# Patient Record
Sex: FEMALE | Race: WHITE | NOT HISPANIC OR LATINO | Employment: OTHER | URBAN - METROPOLITAN AREA
[De-identification: names, ages, dates, MRNs, and addresses within clinical notes are randomized per-mention and may not be internally consistent; named-entity substitution may affect disease eponyms.]

---

## 2017-01-02 ENCOUNTER — TELEPHONE (OUTPATIENT)
Dept: FAMILY MEDICINE | Facility: CLINIC | Age: 52
End: 2017-01-02

## 2017-01-02 NOTE — TELEPHONE ENCOUNTER
Reason for call:  Symptom  Reason for call:  Patient reporting a symptom    Symptom or request: flu sx    Duration (how long have symptoms been present): 5 days    Have you been treated for this before? No    Additional comments: call to advise    Phone Number patient can be reached at: [phone]    Best Time:  any    Can we leave a detailed message on this number:  YES    Call taken on 1/2/2017 at 9:23 AM by Lili Wilhelm

## 2017-01-02 NOTE — TELEPHONE ENCOUNTER
Kamilah Perry is a 51 year old female who calls with concerns of back pain and cough.    NURSING ASSESSMENT:  Description:  Patient states started 12/29/16. Low back pain, whole low back. Achy. Couple days ago hip pain present. Today woke with cramping in right lower leg. Standing and sitting makes back pain worse. OTC medication does not help. Headaches present in the morning, but go away once she gets up for the day. Cough present-dry during day, productive upon waking-clear to yellow in color. Denies weakness, radiating pain, dizziness/lightheadedness, trouble with bowel or bladder, trouble walking or standing, injury, sore throat, fever, ear congestion/pain.   Onset/duration:  12/29/16  Precip. factors:  n/a  Associated symptoms:  See above  Improves/worsens symptoms:  n/a  Pain scale (0-10)   3/10  Last exam/Treatment:  7/1816    Allergies:   Allergies   Allergen Reactions     Sulfa Drugs Hives       MEDICATIONS:   Taking medication(s) as prescribed? N/A  Taking over the counter medication(s?) N/A  Any medication side effects? No significant side effects    Any barriers to taking medication(s) as prescribed?  N/A  Medication(s) improving/managing symptoms?  N/A  Medication reconciliation completed: N/A      NURSING PLAN: Nursing advice to patient home care remedies per protocols. patient would like to wait and see if improvement occurs, otherwise will schedule    RECOMMENDED DISPOSITION:  See in 24 hours - see nursing plan.   Will comply with recommendation: Yes  If further questions/concerns or if symptoms do not improve, worsen or new symptoms develop, call your PCP or [facility] as soon as possible.      Guideline used:  Telephone Triage Protocols for Nurses, Fourth Edition, Zakiya Cohn  Back pain  Headaches   Cough     Maricruz Jacobsen RN

## 2017-01-03 ENCOUNTER — MYC MEDICAL ADVICE (OUTPATIENT)
Dept: FAMILY MEDICINE | Facility: CLINIC | Age: 52
End: 2017-01-03

## 2017-01-03 ENCOUNTER — OFFICE VISIT (OUTPATIENT)
Dept: FAMILY MEDICINE | Facility: CLINIC | Age: 52
End: 2017-01-03
Payer: COMMERCIAL

## 2017-01-03 VITALS
HEART RATE: 80 BPM | BODY MASS INDEX: 26.31 KG/M2 | WEIGHT: 143 LBS | DIASTOLIC BLOOD PRESSURE: 74 MMHG | SYSTOLIC BLOOD PRESSURE: 114 MMHG | HEIGHT: 62 IN | TEMPERATURE: 97.8 F | OXYGEN SATURATION: 99 %

## 2017-01-03 DIAGNOSIS — M54.50 BILATERAL LOW BACK PAIN WITHOUT SCIATICA, UNSPECIFIED CHRONICITY: Primary | ICD-10-CM

## 2017-01-03 DIAGNOSIS — Z12.11 ENCOUNTER FOR SCREENING COLONOSCOPY: ICD-10-CM

## 2017-01-03 DIAGNOSIS — J06.9 UPPER RESPIRATORY TRACT INFECTION, UNSPECIFIED TYPE: ICD-10-CM

## 2017-01-03 DIAGNOSIS — E03.9 HYPOTHYROIDISM, UNSPECIFIED TYPE: Primary | ICD-10-CM

## 2017-01-03 LAB
ALBUMIN UR-MCNC: NEGATIVE MG/DL
APPEARANCE UR: CLEAR
BASOPHILS # BLD AUTO: 0 10E9/L (ref 0–0.2)
BASOPHILS NFR BLD AUTO: 0.2 %
BILIRUB UR QL STRIP: NEGATIVE
COLOR UR AUTO: YELLOW
DIFFERENTIAL METHOD BLD: NORMAL
EOSINOPHIL # BLD AUTO: 0.1 10E9/L (ref 0–0.7)
EOSINOPHIL NFR BLD AUTO: 2.6 %
ERYTHROCYTE [DISTWIDTH] IN BLOOD BY AUTOMATED COUNT: 12.2 % (ref 10–15)
GLUCOSE UR STRIP-MCNC: NEGATIVE MG/DL
HCT VFR BLD AUTO: 40.3 % (ref 35–47)
HGB BLD-MCNC: 14 G/DL (ref 11.7–15.7)
HGB UR QL STRIP: NEGATIVE
KETONES UR STRIP-MCNC: NEGATIVE MG/DL
LEUKOCYTE ESTERASE UR QL STRIP: NEGATIVE
LYMPHOCYTES # BLD AUTO: 1.2 10E9/L (ref 0.8–5.3)
LYMPHOCYTES NFR BLD AUTO: 24.5 %
MCH RBC QN AUTO: 31.5 PG (ref 26.5–33)
MCHC RBC AUTO-ENTMCNC: 34.7 G/DL (ref 31.5–36.5)
MCV RBC AUTO: 91 FL (ref 78–100)
MONOCYTES # BLD AUTO: 0.5 10E9/L (ref 0–1.3)
MONOCYTES NFR BLD AUTO: 9 %
NEUTROPHILS # BLD AUTO: 3.2 10E9/L (ref 1.6–8.3)
NEUTROPHILS NFR BLD AUTO: 63.7 %
NITRATE UR QL: NEGATIVE
PH UR STRIP: 7 PH (ref 5–7)
PLATELET # BLD AUTO: 237 10E9/L (ref 150–450)
RBC # BLD AUTO: 4.45 10E12/L (ref 3.8–5.2)
SP GR UR STRIP: 1.01 (ref 1–1.03)
T4 FREE SERPL-MCNC: 1.47 NG/DL (ref 0.76–1.46)
TSH SERPL DL<=0.05 MIU/L-ACNC: 0.07 MU/L (ref 0.4–4)
URN SPEC COLLECT METH UR: NORMAL
UROBILINOGEN UR STRIP-ACNC: 0.2 EU/DL (ref 0.2–1)
WBC # BLD AUTO: 5 10E9/L (ref 4–11)

## 2017-01-03 PROCEDURE — 81003 URINALYSIS AUTO W/O SCOPE: CPT | Performed by: NURSE PRACTITIONER

## 2017-01-03 PROCEDURE — 36415 COLL VENOUS BLD VENIPUNCTURE: CPT | Performed by: NURSE PRACTITIONER

## 2017-01-03 PROCEDURE — 84439 ASSAY OF FREE THYROXINE: CPT | Performed by: INTERNAL MEDICINE

## 2017-01-03 PROCEDURE — 84443 ASSAY THYROID STIM HORMONE: CPT | Performed by: INTERNAL MEDICINE

## 2017-01-03 PROCEDURE — 85025 COMPLETE CBC W/AUTO DIFF WBC: CPT | Performed by: NURSE PRACTITIONER

## 2017-01-03 PROCEDURE — 99213 OFFICE O/P EST LOW 20 MIN: CPT | Performed by: NURSE PRACTITIONER

## 2017-01-03 ASSESSMENT — PAIN SCALES - GENERAL: PAINLEVEL: MILD PAIN (2)

## 2017-01-03 NOTE — NURSING NOTE
"Chief Complaint   Patient presents with     Back Pain     center, low back in towards the flank      Cough     Panel Management     Flu Shot, Colonoscopy/FIT       Initial /74 mmHg  Pulse 80  Temp(Src) 97.8  F (36.6  C) (Temporal)  Ht 5' 2.24\" (1.581 m)  Wt 143 lb (64.864 kg)  BMI 25.95 kg/m2  SpO2 99% Estimated body mass index is 25.95 kg/(m^2) as calculated from the following:    Height as of this encounter: 5' 2.24\" (1.581 m).    Weight as of this encounter: 143 lb (64.864 kg).  BP completed using cuff size: regular-long      Shantanu Pool MA    "

## 2017-01-03 NOTE — PROGRESS NOTES
"  SUBJECTIVE:                                                    Kamilah Perry is a 51 year old female who presents to clinic today for the following health issues:    Acute Illness   Acute illness concerns: Cough  Onset: 1 weeks      Fever: no    Chills/Sweats: no    Headache (location?): YES- off and on     Sinus Pressure:no- post-nasal drainage    Conjunctivitis:  no    Ear Pain: no    Rhinorrhea: no    Congestion: no    Sore Throat: no     Cough: YES-non-productive, worse in the morning     Wheeze: no    Decreased Appetite: no    Nausea: no    Vomiting: no    Diarrhea:  no    Dysuria/Freq.: no     Fatigue/Achiness: YES- low back center,    Sick/Strep Exposure: YES- son and daughter had cold     Therapies Tried and outcome: OTC meds     Back Pain      Duration: 6 days, pt assumed that it might be related to UTI.         Specific cause: none    Description:   Location of pain: low back center and move toward the side   Character of pain: dull ache  Pain radiation:none  New numbness or weakness in legs, not attributed to pain:  no     Intensity: Currently 2/10    History:   Pain interferes with job: not at the moment pt started work tomorrow   History of back problems: no prior back problems  Any previous MRI or X-rays: None  Sees a specialist for back pain:  No  Therapies tried without relief: motrin     Alleviating factors:   Improved by: none       Precipitating factors:  Worsened by: movements     Functional and Psychosocial Screen (Danna STarT Back):      Not performed today       Accompanying Signs & Symptoms:  Risk of Fracture:  None  Risk of Cauda Equina:  None  Risk of Infection:  None  Risk of Cancer:  None  Risk of Ankylosing Spondylitis:  Onset at age <35, male, AND morning back stiffness. no                Patient reports she has been experiencing a cough and post nasal drip for the past week. Patient notes her cough is productive in the morning, and throughout the day it turns into \"a tickle\" in her " "throat. She states she has a lower back pain, and attributes this to a possible UTI and not the flu. She relates she has had UTIs in the past without other related symptoms. She adds the back pain has been present since Thursday morning. She states the pain radiates to her sides, and describes the pain as \"period cramps in the back.\" She reports motrin and Advil does not alleviate pain, but heating pads alleviate pain mildly. Patient states she has been careful with lifting as she does not want to worsen the pain. Patient denies straining the muscles in her back, issues with bowel movements, constipation and chills. Patient reports her children have been sick as well with body aches, cough and rhinorrhea. Patient states she has not recently been on a long car ride.     Patient states she would like to have thyroid labs done in clinic today.     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     Problem list and histories reviewed & adjusted, as indicated.  Additional history: as documented    Patient Active Problem List   Diagnosis     Xerosis cutis     KP (keratosis pilaris)     CD (contact dermatitis)     Focal hyperhidrosis     Erosio interdigitalis blastomycetica     Hypothyroid     CARDIOVASCULAR SCREENING; LDL GOAL LESS THAN 130     Acute maxillary sinusitis     Generalized anxiety disorder     Past Surgical History   Procedure Laterality Date     Gyn surgery  5/2011     partial hysterectomy     D & c         Social History   Substance Use Topics     Smoking status: Never Smoker      Smokeless tobacco: Never Used     Alcohol Use: Yes      Comment: occasional     Family History   Problem Relation Age of Onset     CANCER Maternal Grandfather      skin     Hypertension Paternal Grandmother      Hypertension Paternal Grandfather      Congenital Anomalies Other      DIABETES No family hx of      C.A.D. No family hx of      Family History Negative No family hx of      Asthma No family hx of      CEREBROVASCULAR " "DISEASE No family hx of      Breast Cancer No family hx of      Cancer - colorectal No family hx of      Alcohol/Drug No family hx of      Alzheimer Disease No family hx of      Arthritis No family hx of      Eye Disorder No family hx of      HEART DISEASE No family hx of      Psychotic Disorder No family hx of      Hearing Loss No family hx of          Current Outpatient Prescriptions   Medication Sig Dispense Refill     citalopram (CELEXA) 10 MG tablet Take 10 mg by mouth daily  3     estradiol (VIVELLE-DOT) 0.075 MG/24HR 0.30mg       magnesium 200 MG TABS Take by mouth daily       PATADAY 0.2 % SOLN        Cholecalciferol (VITAMIN D) 1000 UNITS capsule Take 1 capsule by mouth daily.       Omega-3 Fatty Acids (FISH OIL) 1000 MG CPDR Take  by mouth daily.       levothyroxine (SYNTHROID, LEVOTHROID) 125 MCG tablet Take 125 mcg by mouth daily.       DAILY MULTIVITAMIN OR 1 Tablet daily       Problem list, Medication list, Allergies, and Medical/Social/Surgical histories reviewed in [facility] and updated as appropriate.    ROS:  Constitutional, HEENT, cardiovascular, pulmonary, gi and gu systems are negative, except as otherwise noted.    OBJECTIVE:                                                    /74 mmHg  Pulse 80  Temp(Src) 97.8  F (36.6  C) (Temporal)  Ht 1.581 m (5' 2.24\")  Wt 64.864 kg (143 lb)  BMI 25.95 kg/m2  SpO2 99%  Body mass index is 25.95 kg/(m^2).  GENERAL APPEARANCE: healthy, alert and no distress  HENT: ear canals and TM's normal and nose and mouth without ulcers or lesions. Moderate congestion  NECK: no adenopathy, no asymmetry, masses, or scars and thyroid normal to palpation  RESP: lungs clear to auscultation - no rales, rhonchi or wheezes  CV: regular rates and rhythm, normal S1 S2, no S3 or S4 and no murmur, click or rub  ABDOMEN: soft, nontender, without hepatosplenomegaly or masses and bowel sounds normal  MS: extremities normal- no gross deformities noted  NEURO: Normal strength and " tone, mentation intact and speech normal  Comprehensive low back pain exam:  No tenderness, Range of motion not limited by pain, Lower extremity strength functional and equal on both sides, Lower extremity reflexes within normal limits bilaterally, Lower extremity sensation normal and equal on both sides and Straight leg raise-deferred  PSYCH: mentation appears normal and affect normal/bright    Diagnostic test results:  No results found for this or any previous visit (from the past 24 hour(s)).     ASSESSMENT/PLAN:                                                        ICD-10-CM    1. Bilateral low back pain without sciatica, unspecified chronicity M54.5 *UA reflex to Microscopic and Culture ([facility] (except [place] and [place])     CANCELED: *UA reflex to Microscopic and Culture ([facility] and [facility] (except [place] and [place])   2. Encounter for screening colonoscopy Z12.11 GASTROENTEROLOGY ADULT REFERRAL +/- PROCEDURE   3. Upper respiratory tract infection, unspecified type J06.9 CBC with platelets and differential       Discussed low back pain and possible relation to UTI or influenza. Order placed for UA.     Order placed for colonoscopy. Patient will call and schedule.     Order placed for labs that the patient will complete today.    Follow up with: GI/colonoscopy. Dr. Chinchilla is managing thyroid.    DOMINIC [facility]    This document serves as a record of the services and decisions personally performed and made by Maricruz Moeller DNP. It was created on her behalf by Sushma Billy, a trained medical scribe. The creation of this document is based the provider's statements to the medical scribe.  Sushma Billy 11:08 AM January 3, 2017

## 2017-03-21 ENCOUNTER — TELEPHONE (OUTPATIENT)
Dept: FAMILY MEDICINE | Facility: CLINIC | Age: 52
End: 2017-03-21

## 2017-03-21 NOTE — TELEPHONE ENCOUNTER
Summary:    Patient is due/failing the following:   COLONOSCOPY and PAP    Action needed:   Patient needs office visit for PAP. and schedule a colonoscopy     Type of outreach:    Phone, left message for patient to call back.     Questions for provider review:    None                                                                                                                                    Eloise Yi         Chart routed to Care Team .  Panel Management Review      Patient has the following on her problem list: None      Composite cancer screening  Chart review shows that this patient is due/due soon for the following Pap Smear and Colonoscopy

## 2017-04-17 DIAGNOSIS — E03.9 PRIMARY HYPOTHYROIDISM: Primary | ICD-10-CM

## 2017-04-17 LAB
T4 FREE SERPL-MCNC: 1.35 NG/DL (ref 0.76–1.46)
TSH SERPL DL<=0.05 MIU/L-ACNC: 0.23 MU/L (ref 0.4–4)

## 2017-04-17 PROCEDURE — 36415 COLL VENOUS BLD VENIPUNCTURE: CPT | Performed by: INTERNAL MEDICINE

## 2017-04-17 PROCEDURE — 84439 ASSAY OF FREE THYROXINE: CPT | Performed by: INTERNAL MEDICINE

## 2017-04-17 PROCEDURE — 84443 ASSAY THYROID STIM HORMONE: CPT | Performed by: INTERNAL MEDICINE

## 2017-05-10 ENCOUNTER — THERAPY VISIT (OUTPATIENT)
Dept: PHYSICAL THERAPY | Facility: CLINIC | Age: 52
End: 2017-05-10
Payer: COMMERCIAL

## 2017-05-10 DIAGNOSIS — M54.2 CERVICAL PAIN: Primary | ICD-10-CM

## 2017-05-10 PROCEDURE — 97110 THERAPEUTIC EXERCISES: CPT | Mod: GP | Performed by: PHYSICAL THERAPIST

## 2017-05-10 PROCEDURE — 97112 NEUROMUSCULAR REEDUCATION: CPT | Mod: GP | Performed by: PHYSICAL THERAPIST

## 2017-05-10 NOTE — MR AVS SNAPSHOT
After Visit Summary   5/10/2017    Kamilah Perry    MRN: [id number]           Patient Information     Date Of Birth          1965        Visit Information        Provider Department      5/10/2017 12:30 PM Hilligoss, Amanda K, PT [facility]        Today's Diagnoses     Cervical pain    -  1       Follow-ups after your visit        Your next 10 appointments already scheduled     May 18, 2017  9:45 AM CDT   [facility]   [facility]    [address]  [address]  [place] [facility] [address]   [phone]            May 24, 2017  4:10 PM CDT   [facility] with Amanda K Hilligoss, PT   [facility] ([facility]  )    [address]  [place] [address] [phone]              Who to contact     If you have questions or need follow up information about today's clinic visit or your schedule please contact [facility] directly at [phone].  Normal or non-critical lab and imaging results will be communicated to you by [employer], letter or phone within 4 business days after the clinic has received the results. If you do not hear from us within 7 days, please contact the clinic through [employer] or phone. If you have a critical or abnormal lab result, we will notify you by phone as soon as possible.  Submit refill requests through [employer] or call your pharmacy and they will forward the refill request to us. Please allow 3 business days for your refill to be completed.          Additional Information About Your Visit        MyChart Information     [employer] gives you secure access to your electronic health record. If you see a primary care provider, you can also send messages to your care team and make appointments. If you have questions, please call your primary care clinic.  If  you do not have a primary care provider, please call [phone] and they will assist you.        Care EveryWhere ID     This is your Care EveryWhere ID. This could be used by other organizations to access your [facility] medical records  [id number]         Blood Pressure from Last 3 Encounters:   01/03/17 114/74   07/18/16 122/68   12/30/15 118/70    Weight from Last 3 Encounters:   01/03/17 64.9 kg (143 lb)   07/18/16 66.2 kg (146 lb)   12/30/15 68 kg (150 lb)              We Performed the Following     NEUROMUSCULAR RE-EDUCATION     THERAPEUTIC EXERCISES        Primary Care Provider    None Specified       No primary provider on file.        Thank you!     Thank you for choosing [place] [facility]  for your care. Our goal is always to provide you with excellent care. Hearing back from our patients is one way we can continue to improve our services. Please take a few minutes to complete the written survey that you may receive in the mail after your visit with us. Thank you!             Your Updated Medication List - Protect others around you: Learn how to safely use, store and throw away your medicines at www.disposemymeds.org.          This list is accurate as of: 5/10/17  1:36 PM.  Always use your most recent med list.                   Brand Name Dispense Instructions for use    citalopram 10 MG tablet    celeXA     Take 10 mg by mouth daily       DAILY MULTIVITAMIN PO      1 Tablet daily       estradiol 0.075 MG/24HR BIW patch    VIVELLE-DOT     0.30mg       Fish Oil 1000 MG Cpdr      Take  by mouth daily.       levothyroxine 125 MCG tablet    SYNTHROID/LEVOTHROID     Take 125 mcg by mouth daily.       magnesium 200 MG Tabs      Take by mouth daily       PATADAY 0.2 % Soln   Generic drug:  olopatadine HCl          vitamin D 1000 UNITS capsule      Take 1 capsule by mouth daily.

## 2017-05-10 NOTE — LETTER
[facility]  [address]  [facility] [id number] [phone]    May 10, 2017    Re: Kamilah Perry   :   1965  MRN:  [id number]   REFERRING PHYSICIAN:   Maricruz Moeller    [facility]    Date of Initial Evaluation:  ***  Visits:  Rxs Used: 3  Reason for Referral:  Cervical pain    EVALUATION SUMMARY    Subjective:    HPI                    Objective:    System    Physical Exam    General     ROS    Assessment/Plan:      PROGRESS  REPORT    Progress reporting period is from 8/10/16 to 5/10/17.       SUBJECTIVE  SPt notes she has experienced exacerbation of sx in past 2 months. Feels pain increases most when sitting or lying for any prolonged period of time (30-60 minutes). Has not returned to MD. Has been performing HEP daily, but feels this is no longer enough to control pain. Primary concern is tightness>pain, Has not been having headaches.    Current Pain level: 2/10.     Initial Pain level: 4/10.   Changes in function:  Yes (See Goal flowsheet attached for changes in current functional level)  Adverse reaction to treatment or activity: None    OBJECTIVE  Rounded shoulders, forward head posture. Cervical ROM; Flex 60, Ext 65, SB L 40, R 35, Rotation L 65, R 65; Tenderness present at Left:    Scalenes; Erector Spinae; and Suboccipitals; hypertonic upper traps B     ASSESSMENT/PLAN  Updated problem list and treatment plan: Diagnosis 1:  Cervical pain and stiffness w/ poor posture  Pain -  hot/cold therapy, electric stimulation, manual therapy, self management, education and home program  Decreased ROM/flexibility - manual therapy, therapeutic exercise, therapeutic activity and home program  Impaired muscle performance - neuro re-education and home program  Impaired posture - neuro re-education, therapeutic activities and home program  STG/LTGs have been met or progress has been made towards goals:   Yes (See Goal flow sheet completed today.)  Assessment of Progress: The patient's condition has potential to improve.  The patient's condition has exacerbated.  Self Management Plans:  Patient has been instructed in a home treatment program.  Patient  has been instructed in self management of symptoms.  I have re-evaluated this patient and find that the nature, scope, duration and intensity of the therapy is appropriate for the medical condition of the patient.  Kamilah continues to require the following intervention to meet STG and LTG's:  PT    Recommendations:  This patient would benefit from continued therapy.     Frequency:  1-2 X a month, once daily  Duration:  for 1 month              Thank you for your referral.    INQUIRIES  Therapist:   [facility]  [address] [place] [address] [address]  [place] [address]  Phone: [phone]  Fax: [phone]

## 2017-05-10 NOTE — PROGRESS NOTES
Subjective:    HPI                    Objective:    System    Physical Exam    General     ROS    Assessment/Plan:      PROGRESS  REPORT    Progress reporting period is from 8/10/16 to 5/10/17.       SUBJECTIVE  SPt notes she has experienced exacerbation of sx in past 2 months. Feels pain increases most when sitting or lying for any prolonged period of time (30-60 minutes). Has not returned to MD. Has been performing HEP daily, but feels this is no longer enough to control pain. Primary concern is tightness>pain, Has not been having headaches.    Current Pain level: 2/10.     Initial Pain level: 4/10.   Changes in function:  Yes (See Goal flowsheet attached for changes in current functional level)  Adverse reaction to treatment or activity: None    OBJECTIVE  Rounded shoulders, forward head posture. Cervical ROM; Flex 60, Ext 65, SB L 40, R 35, Rotation L 65, R 65; Tenderness present at Left:    Scalenes; Erector Spinae; and Suboccipitals; hypertonic upper traps B     ASSESSMENT/PLAN  Updated problem list and treatment plan: Diagnosis 1:  Cervical pain and stiffness w/ poor posture  Pain -  hot/cold therapy, electric stimulation, manual therapy, self management, education and home program  Decreased ROM/flexibility - manual therapy, therapeutic exercise, therapeutic activity and home program  Impaired muscle performance - neuro re-education and home program  Impaired posture - neuro re-education, therapeutic activities and home program  STG/LTGs have been met or progress has been made towards goals:  Yes (See Goal flow sheet completed today.)  Assessment of Progress: The patient's condition has potential to improve.  The patient's condition has exacerbated.  Self Management Plans:  Patient has been instructed in a home treatment program.  Patient  has been instructed in self management of symptoms.  I have re-evaluated this patient and find that the nature, scope, duration and intensity of the therapy is appropriate  for the medical condition of the patient.  Kamilah continues to require the following intervention to meet STG and LTG's:  PT    Recommendations:  This patient would benefit from continued therapy.     Frequency:  1-2 X a month, once daily  Duration:  for 1 month        Please refer to the daily flowsheet for treatment today, total treatment time and time spent performing 1:1 timed codes.

## 2017-07-21 ENCOUNTER — TELEPHONE (OUTPATIENT)
Dept: FAMILY MEDICINE | Facility: CLINIC | Age: 52
End: 2017-07-21

## 2017-07-21 NOTE — TELEPHONE ENCOUNTER
Summary:    Patient is due/failing the following:   COLONOSCOPY, Physical  and PAP    Action needed:   Patient needs office visit for Physical and PAP. and schedule a colonoscopy or complete a FIT test     Type of outreach:    Phone, spoke to patient.  patient declines states she completes this outside of [facility]    Questions for provider review:    None                                                                                                                                    Eloise Yi       Chart routed to Care Team .      Panel Management Review      Patient has the following on her problem list: None      Composite cancer screening  Chart review shows that this patient is due/due soon for the following Pap Smear and Colonoscopy

## 2017-07-24 DIAGNOSIS — E03.9 HYPOTHYROID: Primary | ICD-10-CM

## 2017-07-24 LAB
T4 FREE SERPL-MCNC: 1.21 NG/DL (ref 0.76–1.46)
TSH SERPL DL<=0.05 MIU/L-ACNC: 2.3 MU/L (ref 0.4–4)

## 2017-07-24 PROCEDURE — 84439 ASSAY OF FREE THYROXINE: CPT | Performed by: INTERNAL MEDICINE

## 2017-07-24 PROCEDURE — 36415 COLL VENOUS BLD VENIPUNCTURE: CPT | Performed by: INTERNAL MEDICINE

## 2017-07-24 PROCEDURE — 84443 ASSAY THYROID STIM HORMONE: CPT | Performed by: INTERNAL MEDICINE

## 2017-08-02 ENCOUNTER — TELEPHONE (OUTPATIENT)
Dept: FAMILY MEDICINE | Facility: CLINIC | Age: 52
End: 2017-08-02

## 2017-08-02 NOTE — TELEPHONE ENCOUNTER
Pt was seen here for labs on 7/24/17. Myra with Dr. Chinchilla the Endocrinologist called and they would like a copy of the lab results faxed over to them at [phone]. Please call them once this has been done so she can look out for them.

## 2017-08-25 ENCOUNTER — OFFICE VISIT (OUTPATIENT)
Dept: ORTHOPEDICS | Facility: OTHER | Age: 52
End: 2017-08-25
Payer: COMMERCIAL

## 2017-08-25 ENCOUNTER — RADIANT APPOINTMENT (OUTPATIENT)
Dept: GENERAL RADIOLOGY | Facility: OTHER | Age: 52
End: 2017-08-25
Attending: ORTHOPAEDIC SURGERY
Payer: COMMERCIAL

## 2017-08-25 VITALS — WEIGHT: 150 LBS | HEIGHT: 62 IN | TEMPERATURE: 97.8 F | BODY MASS INDEX: 27.6 KG/M2

## 2017-08-25 DIAGNOSIS — M24.271 DISORDER OF LIGAMENT, RIGHT ANKLE: Primary | Chronic | ICD-10-CM

## 2017-08-25 DIAGNOSIS — M25.571 CHRONIC PAIN OF RIGHT ANKLE: ICD-10-CM

## 2017-08-25 DIAGNOSIS — G89.29 CHRONIC PAIN OF RIGHT ANKLE: ICD-10-CM

## 2017-08-25 DIAGNOSIS — M25.579 ANKLE PAIN: ICD-10-CM

## 2017-08-25 PROCEDURE — 73610 X-RAY EXAM OF ANKLE: CPT | Mod: RT

## 2017-08-25 PROCEDURE — 99203 OFFICE O/P NEW LOW 30 MIN: CPT | Performed by: ORTHOPAEDIC SURGERY

## 2017-08-25 ASSESSMENT — PAIN SCALES - GENERAL: PAINLEVEL: NO PAIN (0)

## 2017-08-25 NOTE — PROGRESS NOTES
Kamilah Perry is a 52 year old female who is seen in consultation at the request of no one.  History of Present illness:  Kamilah presents for evaluation of:  1.) rt ankle   2.)   Onset:  off and on for 5 yrs after injury    Symptoms brought on by sled rolled over it.   Location:  rt ankle.    Character:  dull ache.    Progression of symptoms:  worse.    Previous similar pain: no .   Pain Level:  0/10.   Previous treatments:  heat and elevate.  Currently on Blood thinners? no  Diagnosis of Diabetes? no
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
## 2017-08-25 NOTE — NURSING NOTE
"Chief Complaint   Patient presents with     Consult     rt ankle pain       Initial Temp 97.8  F (36.6  C)  Ht 1.585 m (5' 2.4\")  Wt 68 kg (150 lb)  BMI 27.08 kg/m2 Estimated body mass index is 27.08 kg/(m^2) as calculated from the following:    Height as of this encounter: 1.585 m (5' 2.4\").    Weight as of this encounter: 68 kg (150 lb).  Medication Reconciliation: complete    BP completed using cuff size: NA (Not Taken)    Alba Gomez MA      "
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
## 2017-08-25 NOTE — MR AVS SNAPSHOT
After Visit Summary   8/25/2017    Kamilah Perry    MRN: [id number]           Patient Information     Date Of Birth          1965        Visit Information        Provider Department      8/25/2017 1:20 PM Marine Rodriguez MD [facility]        Today's Diagnoses     Chronic pain of right ankle    -  1      Care Instructions    Recommend you use the brace as needed to help with symptoms    We also recommended physical therapy for your ankle.     We also recommended you follow-up with the podiatrist.      Here are some home exercises:      Foot and Ankle Exercises: Ankle Circles    This exercise is designed to stretch and strengthen your feet and ankles. Before beginning the exercise, read through all the instructions. While exercising, breathe normally. If you feel any pain, stop the exercise. If pain persists, inform your healthcare provider.    Sit straight-legged on the floor or other firm surface.    Resting your __right____ calf on a rolled-up towel, use your foot to draw circles in both directions or write the letters of the alphabet in the air.    Continue for _30_____ seconds. Do __3-4____ times a day.            Follow-ups after your visit        Who to contact     If you have questions or need follow up information about today's clinic visit or your schedule please contact [facility] directly at [phone].  Normal or non-critical lab and imaging results will be communicated to you by MyChart, letter or phone within 4 business days after the clinic has received the results. If you do not hear from us within 7 days, please contact the clinic through MyChart or phone. If you have a critical or abnormal lab result, we will notify you by phone as soon as possible.  Submit refill requests through [employer]PAD or call your pharmacy and they will forward the refill request to us. Please allow 3 business days for your refill to be completed.          Additional  "Information About Your Visit        MyChart Information     [employer] gives you secure access to your electronic health record. If you see a primary care provider, you can also send messages to your care team and make appointments. If you have questions, please call your primary care clinic.  If you do not have a primary care provider, please call [phone] and they will assist you.        Care EveryWhere ID     This is your Care EveryWhere ID. This could be used by other organizations to access your [facility] medical records  [id number]        Your Vitals Were     Temperature Height BMI (Body Mass Index)             97.8  F (36.6  C) 1.585 m (5' 2.4\") 27.08 kg/m2          Blood Pressure from Last 3 Encounters:   01/03/17 114/74   07/18/16 122/68   12/30/15 118/70    Weight from Last 3 Encounters:   08/25/17 68 kg (150 lb)   01/03/17 64.9 kg (143 lb)   07/18/16 66.2 kg (146 lb)               Primary Care Provider Office Phone # Fax #    DOMINIC Yin [facility] [phone]       [address] [facility] [address]        Equal Access to Services     JAMARCUS LIRA AH: Hadii aad ku hadasho Soomaali, waaxda luqadaha, qaybta kaalmada adeegyada, macy riveran stella james. So [facility] [phone].    ATENCIÓN: Si habla español, tiene a sears disposición servicios gratuitos de asistencia lingüística. Ll[facility] [phone].    We comply with applicable federal civil rights laws and [place] laws. We do not discriminate on the basis of race, color, national origin, age, disability sex, sexual orientation or gender identity.            Thank you!     Thank you for choosing [facility]  for your care. Our goal is always to provide you with excellent care. Hearing back from our patients is one way we can continue to improve our services. Please take a few minutes to complete the written survey that you may receive in the mail after your visit with us. Thank you!             Your Updated " Medication List - Protect others around you: Learn how to safely use, store and throw away your medicines at www.disposemymeds.org.          This list is accurate as of: 8/25/17  2:07 PM.  Always use your most recent med list.                   Brand Name Dispense Instructions for use Diagnosis    citalopram 10 MG tablet    celeXA     Take 10 mg by mouth daily        DAILY MULTIVITAMIN PO      1 Tablet daily        estradiol 0.075 MG/24HR BIW patch    VIVELLE-DOT     0.30mg        Fish Oil 1000 MG Cpdr      Take  by mouth daily.        levothyroxine 125 MCG tablet    SYNTHROID/LEVOTHROID     Take 125 mcg by mouth daily.        magnesium 200 MG Tabs      Take by mouth daily        PATADAY 0.2 % Soln   Generic drug:  olopatadine HCl           vitamin D 1000 UNITS capsule      Take 1 capsule by mouth daily.

## 2017-08-25 NOTE — PATIENT INSTRUCTIONS
Recommend you use the brace as needed to help with symptoms    We also recommended physical therapy for your ankle.     We also recommended you follow-up with the podiatrist.      Here are some home exercises:      Foot and Ankle Exercises: Ankle Circles    This exercise is designed to stretch and strengthen your feet and ankles. Before beginning the exercise, read through all the instructions. While exercising, breathe normally. If you feel any pain, stop the exercise. If pain persists, inform your healthcare provider.    Sit straight-legged on the floor or other firm surface.    Resting your __right____ calf on a rolled-up towel, use your foot to draw circles in both directions or write the letters of the alphabet in the air.    Continue for _30_____ seconds. Do __3-4____ times a day.

## 2017-09-08 ENCOUNTER — OFFICE VISIT (OUTPATIENT)
Dept: PODIATRY | Facility: CLINIC | Age: 52
End: 2017-09-08
Payer: COMMERCIAL

## 2017-09-08 VITALS — BODY MASS INDEX: 27.6 KG/M2 | WEIGHT: 150 LBS | HEIGHT: 62 IN

## 2017-09-08 DIAGNOSIS — M76.61 ACHILLES BURSITIS OF RIGHT LOWER EXTREMITY: ICD-10-CM

## 2017-09-08 DIAGNOSIS — M76.821 TIBIALIS POSTERIOR TENDINITIS, RIGHT: Primary | ICD-10-CM

## 2017-09-08 PROCEDURE — 99202 OFFICE O/P NEW SF 15 MIN: CPT | Performed by: PODIATRIST

## 2017-09-08 ASSESSMENT — PAIN SCALES - GENERAL: PAINLEVEL: NO PAIN (0)
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
## 2017-09-08 NOTE — MR AVS SNAPSHOT
After Visit Summary   9/8/2017    Kamilah Perry    MRN: [id number]           Patient Information     Date Of Birth          1965        Visit Information        Provider Department      9/8/2017 11:00 AM Kalpesh Schumacher DPM [facility]        Today's Diagnoses     Tibialis posterior tendinitis, right    -  1    Achilles bursitis of right lower extremity           Follow-ups after your visit        Additional Services     PHYSICAL THERAPY REFERRAL (Internal)       Physical Therapy Referral                  Who to contact     If you have questions or need follow up information about today's clinic visit or your schedule please contact [facility] directly at [phone].  Normal or non-critical lab and imaging results will be communicated to you by IPTEGOhart, letter or phone within 4 business days after the clinic has received the results. If you do not hear from us within 7 days, please contact the clinic through IPTEGOhart or phone. If you have a critical or abnormal lab result, we will notify you by phone as soon as possible.  Submit refill requests through X2IMPACT or call your pharmacy and they will forward the refill request to us. Please allow 3 business days for your refill to be completed.          Additional Information About Your Visit        MyChart Information     X2IMPACT gives you secure access to your electronic health record. If you see a primary care provider, you can also send messages to your care team and make appointments. If you have questions, please call your primary care clinic.  If you do not have a primary care provider, please call [phone] and they will assist you.      X2IMPACT is an electronic gateway that provides easy, online access to your medical records. With X2IMPACT, you can request a clinic appointment, read your test results, renew a prescription or communicate with your care team.     To access your existing account, please  "contact your [facility] or call [phone] for assistance.        Care EveryWhere ID     This is your Care EveryWhere ID. This could be used by other organizations to access your [place] medical records  [id number]        Your Vitals Were     Height BMI (Body Mass Index)                1.585 m (5' 2.4\") 27.08 kg/m2           Blood Pressure from Last 3 Encounters:   01/03/17 114/74   07/18/16 122/68   12/30/15 118/70    Weight from Last 3 Encounters:   09/08/17 68 kg (150 lb)   08/25/17 68 kg (150 lb)   01/03/17 64.9 kg (143 lb)              We Performed the Following     PHYSICAL THERAPY REFERRAL (Internal)        Primary Care Provider Office Phone # Fax #    DOMINIC Yin [facility] [phone] [address] [facility] [address]        Equal Access to Services     JAMARCUS LIRA : Hadii aad ku hadasho Soomaali, waaxda luqadaha, qaybta kaalmada adeegyada, waxay idiin hayjeren stella medina . So [facility] [phone].    ATENCIÓN: Si habla español, tiene a sears disposición servicios gratuitos de asistencia lingüística. Llame al [phone].    We comply with applicable federal civil rights laws and [place] laws. We do not discriminate on the basis of race, color, national origin, age, disability sex, sexual orientation or gender identity.            Thank you!     Thank you for choosing [facility]  for your care. Our goal is always to provide you with excellent care. Hearing back from our patients is one way we can continue to improve our services. Please take a few minutes to complete the written survey that you may receive in the mail after your visit with us. Thank you!             Your Updated Medication List - Protect others around you: Learn how to safely use, store and throw away your medicines at www.disposemymeds.org.          This list is accurate as of: 9/8/17  1:54 PM.  Always use your most recent med list.                   Brand " Name Dispense Instructions for use Diagnosis    citalopram 10 MG tablet    celeXA     Take 10 mg by mouth daily        DAILY MULTIVITAMIN PO      1 Tablet daily        estradiol 0.075 MG/24HR BIW patch    VIVELLE-DOT     0.30mg        Fish Oil 1000 MG Cpdr      Take  by mouth daily.        levothyroxine 125 MCG tablet    SYNTHROID/LEVOTHROID     Take 125 mcg by mouth daily.        magnesium 200 MG Tabs      Take by mouth daily        PATADAY 0.2 % Soln   Generic drug:  olopatadine HCl           vitamin D 1000 UNITS capsule      Take 1 capsule by mouth daily.

## 2017-09-08 NOTE — NURSING NOTE
"Kamilah Kabagurmeet's goals for this visit include: Right ankle injury check   She requests these members of her care team be copied on today's visit information: yes    PCP: Maricruz Moeller    Referring Provider:  No referring provider defined for this encounter.    Chief Complaint   Patient presents with     Consult     Musculoskeletal Problem       Initial Ht 1.585 m (5' 2.4\")  Wt 68 kg (150 lb)  BMI 27.08 kg/m2 Estimated body mass index is 27.08 kg/(m^2) as calculated from the following:    Height as of this encounter: 1.585 m (5' 2.4\").    Weight as of this encounter: 68 kg (150 lb).  Medication Reconciliation: complete    "

## 2017-09-24 ENCOUNTER — HEALTH MAINTENANCE LETTER (OUTPATIENT)
Age: 52
End: 2017-09-24

## 2017-12-18 ENCOUNTER — APPOINTMENT (OUTPATIENT)
Dept: LAB | Facility: CLINIC | Age: 52
End: 2017-12-18
Payer: COMMERCIAL

## 2017-12-18 DIAGNOSIS — E03.9 ACQUIRED HYPOTHYROIDISM: Primary | ICD-10-CM

## 2017-12-18 LAB
T4 FREE SERPL-MCNC: 1.27 NG/DL (ref 0.76–1.46)
TSH SERPL DL<=0.005 MIU/L-ACNC: 0.81 MU/L (ref 0.4–4)

## 2017-12-18 PROCEDURE — 36415 COLL VENOUS BLD VENIPUNCTURE: CPT | Performed by: INTERNAL MEDICINE

## 2017-12-18 PROCEDURE — 84439 ASSAY OF FREE THYROXINE: CPT | Performed by: INTERNAL MEDICINE

## 2017-12-18 PROCEDURE — 84443 ASSAY THYROID STIM HORMONE: CPT | Performed by: INTERNAL MEDICINE

## 2018-01-03 NOTE — PROGRESS NOTES
"  SUBJECTIVE:                                                    Kamilah Perry is a 52 year old female who presents to clinic today for the following health issues:      History of Present Illness     Diet:  Regular (no restrictions)  Taking medications regularly:  Yes  Medication side effects:  Not applicable  Additional concerns today:  No      Acute Illness   Acute illness concerns: left ear cracking, clicking sounds   Onset: 1 month off and on    Fever: no    Chills/Sweats: no    Headache (location?): no    Sinus Pressure:YES- off and on     Conjunctivitis:  no    Ear Pain: left ear, not plugged just clicking sounds     Rhinorrhea: no    Congestion: no    Sore Throat: no     Cough: no    Wheeze: no    Decreased Appetite: no    Nausea: no    Vomiting: no    Diarrhea:  no    Dysuria/Freq.: no    Fatigue/Achiness: no    Sick/Strep Exposure: no     Therapies Tried and outcome: none       Patient reports today that she has been experiencing a \"clicking\" noise in her left ear that started one month ago. She states that it causes her to be unable to hear as well out of that ear. She is unsure if she has wax in that ear. There is no pain, it only bothersome. Not waking up at night with pain. No recent cold symptoms.         Problem list and histories reviewed & adjusted, as indicated.  Additional history: as documented      Patient Active Problem List   Diagnosis     Xerosis cutis     KP (keratosis pilaris)     CD (contact dermatitis)     Focal hyperhidrosis     Erosio interdigitalis blastomycetica     Hypothyroid     CARDIOVASCULAR SCREENING; LDL GOAL LESS THAN 130     Acute maxillary sinusitis     Generalized anxiety disorder     Cervical pain     Disorder of ligament of ankle, right     Past Surgical History:   Procedure Laterality Date     D & C       GYN SURGERY  5/2011    partial hysterectomy       Social History   Substance Use Topics     Smoking status: Never Smoker     Smokeless tobacco: Never Used     Alcohol " use Yes      Comment: occasional     Family History   Problem Relation Age of Onset     CANCER Maternal Grandfather      skin     Hypertension Paternal Grandmother      Hypertension Paternal Grandfather      Congenital Anomalies Other      DIABETES No family hx of      C.A.D. No family hx of      Family History Negative No family hx of      Asthma No family hx of      CEREBROVASCULAR DISEASE No family hx of      Breast Cancer No family hx of      Cancer - colorectal No family hx of      Alcohol/Drug No family hx of      Alzheimer Disease No family hx of      Arthritis No family hx of      Eye Disorder No family hx of      HEART DISEASE No family hx of      Psychotic Disorder No family hx of      Hearing Loss No family hx of          Current Outpatient Prescriptions   Medication Sig Dispense Refill     citalopram (CELEXA) 10 MG tablet Take 10 mg by mouth daily  3     estradiol (VIVELLE-DOT) 0.075 MG/24HR 0.30mg       PATADAY 0.2 % SOLN        Cholecalciferol (VITAMIN D) 1000 UNITS capsule Take 1 capsule by mouth daily.       Omega-3 Fatty Acids (FISH OIL) 1000 MG CPDR Take  by mouth daily.       levothyroxine (SYNTHROID, LEVOTHROID) 125 MCG tablet Take 125 mcg by mouth daily.       DAILY MULTIVITAMIN OR 1 Tablet daily       magnesium 200 MG TABS Take by mouth daily       Allergies   Allergen Reactions     Sulfa Drugs Hives     Recent Labs   Lab Test  12/18/17   0951  07/24/17   1033   11/30/15   0824   LDL   --    --    --   142*   HDL   --    --    --   70   TRIG   --    --    --   151*   ALT   --    --    --   33   CR   --    --    --   0.84   GFRESTIMATED   --    --    --   72   GFRESTBLACK   --    --    --   87   POTASSIUM   --    --    --   4.2   TSH  0.81  2.30   < >  2.33    < > = values in this interval not displayed.      BP Readings from Last 3 Encounters:   01/05/18 102/70   01/03/17 114/74   07/18/16 122/68    Wt Readings from Last 3 Encounters:   01/05/18 151 lb (68.5 kg)   09/08/17 150 lb (68 kg)  "  08/25/17 150 lb (68 kg)         Labs reviewed in EPIC    ROS:  Constitutional, HEENT, cardiovascular, pulmonary, GI, , musculoskeletal, neuro, skin, endocrine and psych systems are negative, except as otherwise noted.    This document serves as a record of the services and decisions personally performed and made by Maricruz Moeller CNP. It was created on her behalf by Sarah Ortega, a trained medical scribe. The creation of this document is based the provider's statements to the medical scribe.    Sarah Ortega January 5, 2018 11:04 AM    OBJECTIVE:   /70  Pulse 75  Temp 97.6  F (36.4  C) (Temporal)  Ht 5' 2\" (1.575 m)  Wt 151 lb (68.5 kg)  SpO2 100%  BMI 27.62 kg/m2  Body mass index is 27.62 kg/(m^2).  GENERAL: healthy, alert and no distress  HENT: normal cephalic/atraumatic, ears: Bilateral TM scarring noted, no effusions. Left TM in the superior quarter portion, there seems to be an anton colred appendage. Not sure if it is adhering to or resting on the TM,nose and mouth without ulcers or lesions, oropharynx clear and oral mucous membranes moist    Diagnostic Test Results:  none     ASSESSMENT/PLAN:       ICD-10-CM    1. Disorder of tympanic membrane of left ear H73.92 SCREENING TEST, PURE TONE, AIR ONLY   2. Conductive hearing loss of left ear with unrestricted hearing of right ear H90.12        Obtained hearing test which showed decreased hearing on left when compared to right.  See ear exam above. Light ear wash today on left only.  After ear wash, appendage visualized did not change, looks on TM with scarring from previous infecitons. Will place a referral to ENT for further evaluation; see orders.     Of note, patient completes yearly pap smears with her Ob/Gyn yearly. Health maintenance has not been updated. I instructed patient to bring in copy of her records at her next visit to update health maintenance.    Also encouraged patient complete FIT test.      Follow-up: with ENT for further " evaluation        The information in this document, created by the medical scribe for me, accurately reflects the services I personally performed and the decisions made by me. I have reviewed and approved this document for accuracy prior to leaving the patient care area.    DOMINIC [facility]

## 2018-01-05 ENCOUNTER — OFFICE VISIT (OUTPATIENT)
Dept: FAMILY MEDICINE | Facility: CLINIC | Age: 53
End: 2018-01-05
Payer: COMMERCIAL

## 2018-01-05 VITALS
TEMPERATURE: 97.6 F | DIASTOLIC BLOOD PRESSURE: 70 MMHG | HEART RATE: 75 BPM | OXYGEN SATURATION: 100 % | BODY MASS INDEX: 27.79 KG/M2 | WEIGHT: 151 LBS | SYSTOLIC BLOOD PRESSURE: 102 MMHG | HEIGHT: 62 IN

## 2018-01-05 DIAGNOSIS — H73.92 DISORDER OF TYMPANIC MEMBRANE OF LEFT EAR: Primary | ICD-10-CM

## 2018-01-05 DIAGNOSIS — H90.12 CONDUCTIVE HEARING LOSS OF LEFT EAR WITH UNRESTRICTED HEARING OF RIGHT EAR: ICD-10-CM

## 2018-01-05 PROCEDURE — 92551 PURE TONE HEARING TEST AIR: CPT | Performed by: NURSE PRACTITIONER

## 2018-01-05 PROCEDURE — 99213 OFFICE O/P EST LOW 20 MIN: CPT | Performed by: NURSE PRACTITIONER

## 2018-01-05 ASSESSMENT — ANXIETY QUESTIONNAIRES
6. BECOMING EASILY ANNOYED OR IRRITABLE: NOT AT ALL
7. FEELING AFRAID AS IF SOMETHING AWFUL MIGHT HAPPEN: NOT AT ALL
3. WORRYING TOO MUCH ABOUT DIFFERENT THINGS: NOT AT ALL
4. TROUBLE RELAXING: NOT AT ALL
7. FEELING AFRAID AS IF SOMETHING AWFUL MIGHT HAPPEN: NOT AT ALL
2. NOT BEING ABLE TO STOP OR CONTROL WORRYING: NOT AT ALL
GAD7 TOTAL SCORE: 0
1. FEELING NERVOUS, ANXIOUS, OR ON EDGE: NOT AT ALL
5. BEING SO RESTLESS THAT IT IS HARD TO SIT STILL: NOT AT ALL

## 2018-01-05 ASSESSMENT — PAIN SCALES - GENERAL: PAINLEVEL: NO PAIN (0)

## 2018-01-05 NOTE — MR AVS SNAPSHOT
"              After Visit Summary   1/5/2018    Kamilah Perry    MRN: [id number]           Patient Information     Date Of Birth          1965        Visit Information        Provider Department      1/5/2018 10:40 AM Maricruz Moeller APRN CNP [facility]        Today's Diagnoses     Disorder of tympanic membrane of left ear    -  1    Conductive hearing loss of left ear with unrestricted hearing of right ear           Follow-ups after your visit        Who to contact     If you have questions or need follow up information about today's clinic visit or your schedule please [facility] directly at [phone].  Normal or non-critical lab and imaging results will be communicated to you by KTK Grouphart, letter or phone within 4 business days after the clinic has received the results. If you do not hear from us within 7 days, please contact the clinic through PickUpPalt or phone. If you have a critical or abnormal lab result, we will notify you by phone as soon as possible.  Submit refill requests through RealDeck or call your pharmacy and they will forward the refill request to us. Please allow 3 business days for your refill to be completed.          Additional Information About Your Visit        MyChart Information     RealDeck gives you secure access to your electronic health record. If you see a primary care provider, you can also send messages to your care team and make appointments. If you have questions, please call your primary care clinic.  If you do not have a primary care provider, please call [phone] and they will assist you.        Care EveryWhere ID     This is your Care EveryWhere ID. This could be used by other organizations to access your [place] medical records  [id number]        Your Vitals Were     Pulse Temperature Height Pulse Oximetry BMI (Body Mass Index)       75 97.6  F (36.4  C) (Temporal) 5' 2\" (1.575 m) 100% 27.62 kg/m2        Blood Pressure from Last 3 " Encounters:   01/05/18 102/70   01/03/17 114/74   07/18/16 122/68    Weight from Last 3 Encounters:   01/05/18 151 lb (68.5 kg)   09/08/17 150 lb (68 kg)   08/25/17 150 lb (68 kg)              We Performed the Following     SCREENING TEST, PURE TONE, AIR ONLY        Primary Care Provider Office Phone # Fax #    Maricruz Moeller, APRN [facility] [phone] [address] [facility] [address]        Equal Access to Services     [facility]: Hadii aad ku hadasho Soomaali, waaxda luqadaha, qaybta kaalmada adeegyada, waxay flakoin hayaan stella medina . So [facility] [phone].    ATENCIÓN: Si habla español, tiene a sears disposición servicios gratuitos de asistencia lingüística. Llame al [phone].    We comply with applicable federal civil rights laws and [place] laws. We do not discriminate on the basis of race, color, national origin, age, disability, sex, sexual orientation, or gender identity.            Thank you!     Thank you for choosing [facility]  for your care. Our goal is always to provide you with excellent care. Hearing back from our patients is one way we can continue to improve our services. Please take a few minutes to complete the written survey that you may receive in the mail after your visit with us. Thank you!             Your Updated Medication List - Protect others around you: Learn how to safely use, store and throw away your medicines at www.disposemymeds.org.          This list is accurate as of: 1/5/18 11:59 PM.  Always use your most recent med list.                   Brand Name Dispense Instructions for use Diagnosis    citalopram 10 MG tablet    celeXA     Take 10 mg by mouth daily        DAILY MULTIVITAMIN PO      1 Tablet daily        estradiol 0.075 MG/24HR BIW patch    VIVELLE-DOT     0.30mg        Fish Oil 1000 MG Cpdr      Take  by mouth daily.        levothyroxine 125 MCG tablet    SYNTHROID/LEVOTHROID     Take 125 mcg by mouth daily.        magnesium  200 MG Tabs      Take by mouth daily        PATADAY 0.2 % Soln   Generic drug:  olopatadine HCl           vitamin D 1000 UNITS capsule      Take 1 capsule by mouth daily.

## 2018-01-06 ASSESSMENT — ANXIETY QUESTIONNAIRES: GAD7 TOTAL SCORE: 0

## 2018-01-08 NOTE — NURSING NOTE
HEARING FREQUENCY:   Right Ear:  500 Hz: 25 db HL   1000 Hz: 20 db HL   2000 Hz: 20 db HL   3000 Hz: 20 db HL   4000 Hz: 20 db HL   6000 Hz: 20 db HL  Left Ear:  500 Hz: 30 db HL   1000 Hz: 20 db HL   2000 Hz: 20 db HL   3000 Hz: 20 db HL   4000 Hz: 20 db HL   6000 Hz: 20 db HL jerry Pool MA

## 2018-01-22 ENCOUNTER — OFFICE VISIT (OUTPATIENT)
Dept: OTOLARYNGOLOGY | Facility: CLINIC | Age: 53
End: 2018-01-22
Payer: COMMERCIAL

## 2018-01-22 VITALS — SYSTOLIC BLOOD PRESSURE: 133 MMHG | HEART RATE: 80 BPM | OXYGEN SATURATION: 98 % | DIASTOLIC BLOOD PRESSURE: 82 MMHG

## 2018-01-22 DIAGNOSIS — H93.12 TINNITUS, LEFT: Primary | ICD-10-CM

## 2018-01-22 PROCEDURE — 99203 OFFICE O/P NEW LOW 30 MIN: CPT | Mod: 25 | Performed by: OTOLARYNGOLOGY

## 2018-01-22 PROCEDURE — 92504 EAR MICROSCOPY EXAMINATION: CPT | Performed by: OTOLARYNGOLOGY

## 2018-01-22 ASSESSMENT — PAIN SCALES - GENERAL: PAINLEVEL: NO PAIN (0)

## 2018-01-22 NOTE — NURSING NOTE
"Kamilah Perry's goals for this visit include:   Chief Complaint   Patient presents with     Consult     Crackling/pressure in left ear (x1 month)       She requests these members of her care team be copied on today's visit information: Yes    PCP: Maricruz Moeller    Referring Provider:  No referring provider defined for this encounter.    Chief Complaint   Patient presents with     Consult     Crackling/pressure in left ear (x1 month)       Initial /82 (BP Location: Right arm, Patient Position: Sitting, Cuff Size: Adult Regular)  Pulse 80  SpO2 98% Estimated body mass index is 27.62 kg/(m^2) as calculated from the following:    Height as of 1/5/18: 1.575 m (5' 2\").    Weight as of 1/5/18: 68.5 kg (151 lb).  Medication Reconciliation: complete    Do you need any medication refills at today's visit? No    "

## 2018-01-22 NOTE — MR AVS SNAPSHOT
After Visit Summary   1/22/2018    Kamilah Perry    MRN: [id number]           Patient Information     Date Of Birth          1965        Visit Information        Provider Department      1/22/2018 1:00 PM Marine Serrano MD [facility]        Today's Diagnoses     Tinnitus, left    -  1       Follow-ups after your visit        Who to contact     If you have questions or need follow up information about today's clinic visit or your schedule please contact [facility] directly at [phone].  Normal or non-critical lab and imaging results will be communicated to you by PAS-Analytikhart, letter or phone within 4 business days after the clinic has received the results. If you do not hear from us within 7 days, please contact the clinic through PAS-Analytikhart or phone. If you have a critical or abnormal lab result, we will notify you by phone as soon as possible.  Submit refill requests through GeneriCo or call your pharmacy and they will forward the refill request to us. Please allow 3 business days for your refill to be completed.          Additional Information About Your Visit        MyChart Information     GeneriCo gives you secure access to your electronic health record. If you see a primary care provider, you can also send messages to your care team and make appointments. If you have questions, please call your primary care clinic.  If you do not have a primary care provider, please call [phone] and they will assist you.      GeneriCo is an electronic gateway that provides easy, online access to your medical records. With GeneriCo, you can request a clinic appointment, read your test results, renew a prescription or communicate with your care team.     To access your existing account, please contact your [facility] or call [phone] for assistance.        Care EveryWhere ID     This is your Care EveryWhere ID. This could be used by  other organizations to access your [place] medical records  [id number]        Your Vitals Were     Pulse Pulse Oximetry                80 98%           Blood Pressure from Last 3 Encounters:   01/22/18 133/82   01/05/18 102/70   01/03/17 114/74    Weight from Last 3 Encounters:   01/05/18 68.5 kg (151 lb)   09/08/17 68 kg (150 lb)   08/25/17 68 kg (150 lb)              We Performed the Following     Microscopy, Binocular        Primary Care Provider Office Phone # Fax #    Maricruz DOMINIC [facility] [phone] [address] [facility] [address]        Equal Access to Services     [facility] : Hadii aad ku hadasho Sobreeali, waaxda luqadaha, qaybta kaalmada adeegyada, macy medina . So [facility] [phone].    ATENCIÓN: Si habla español, tiene a sears disposición servicios gratuitos de asistencia lingüística. Llame al [phone].    We comply with applicable federal civil rights laws and [place] laws. We do not discriminate on the basis of race, color, national origin, age, disability, sex, sexual orientation, or gender identity.            Thank you!     Thank you for choosing [facility]  for your care. Our goal is always to provide you with excellent care. Hearing back from our patients is one way we can continue to improve our services. Please take a few minutes to complete the written survey that you may receive in the mail after your visit with us. Thank you!             Your Updated Medication List - Protect others around you: Learn how to safely use, store and throw away your medicines at www.disposemymeds.org.          This list is accurate as of: 1/22/18  1:27 PM.  Always use your most recent med list.                   Brand Name Dispense Instructions for use Diagnosis    citalopram 10 MG tablet    celeXA     Take 10 mg by mouth daily        DAILY MULTIVITAMIN PO      1 Tablet daily        estradiol 0.075 MG/24HR BIW patch    VIVELLE-DOT      0.30mg        Fish Oil 1000 MG Cpdr      Take  by mouth daily.        levothyroxine 125 MCG tablet    SYNTHROID/LEVOTHROID     Take 125 mcg by mouth daily.        magnesium 200 MG Tabs      Take by mouth daily        PATADAY 0.2 % Soln   Generic drug:  olopatadine HCl           vitamin D 1000 UNITS capsule      Take 1 capsule by mouth daily.

## 2018-01-22 NOTE — LETTER
2018         RE: Kamilah Perry  [id number] [address] [place] [id number]        Dear Colleague,    Thank you for referring your patient, Kamilah Perry, to the [facility]. Please see a copy of my visit note below.    Otolaryngology Adult Consultation    Patient: Kamilah Perry  : 1965      HPI:  Kamilah Perry is a 52 year old female seen today in the Otolaryngology Clinic for left ear popping, crackling, tinnitus.  Symptoms have been present for several months. She feels like it got worse after recent ear lavage for ear cleaning. Symptoms are on the left side. Symptoms while wax and wane. She denies any ear pain. She denies any dizziness. She does feel like her hearing is not as good bilaterally. She had an ear screening done with her primary care which showed potentially a mild hearing loss. She also notes that her primary told her that she had some scarring on her tympanic membrane.    Medications:  Current Outpatient Rx   Medication Sig Dispense Refill     citalopram (CELEXA) 10 MG tablet Take 10 mg by mouth daily  3     estradiol (VIVELLE-DOT) 0.075 MG/24HR 0.30mg       magnesium 200 MG TABS Take by mouth daily       PATADAY 0.2 % SOLN        Cholecalciferol (VITAMIN D) 1000 UNITS capsule Take 1 capsule by mouth daily.       Omega-3 Fatty Acids (FISH OIL) 1000 MG CPDR Take  by mouth daily.       levothyroxine (SYNTHROID, LEVOTHROID) 125 MCG tablet Take 125 mcg by mouth daily.       DAILY MULTIVITAMIN OR 1 Tablet daily         Allergies: Sulfa drugs     PMH:  Past Medical History:   Diagnosis Date     Hypothyroid        PSH:  Past Surgical History:   Procedure Laterality Date     D & C       GYN SURGERY  2011    partial hysterectomy       FH:  Family History   Problem Relation Age of Onset     CANCER Maternal Grandfather      skin     Hypertension Paternal Grandmother      Hypertension Paternal Grandfather      Congenital Anomalies Other      DIABETES No family hx of       C.A.D. No family hx of      Family History Negative No family hx of      Asthma No family hx of      CEREBROVASCULAR DISEASE No family hx of      Breast Cancer No family hx of      Cancer - colorectal No family hx of      Alcohol/Drug No family hx of      Alzheimer Disease No family hx of      Arthritis No family hx of      Eye Disorder No family hx of      HEART DISEASE No family hx of      Psychotic Disorder No family hx of      Hearing Loss No family hx of         SH:  Social History   Substance Use Topics     Smoking status: Never Smoker     Smokeless tobacco: Never Used     Alcohol use Yes      Comment: occasional       Review of Systems  No flowsheet data found.    Physical Exam:    GEN:  The patient is alert, oriented and in no acute distress.  HEAD:  Head, face scalp is grossly normal.  EARS:  Under the binocular microscope the ears were visualized. The left ear shows no significant cerumen present. The tympanic membrane is intact. There is a little myringosclerosis present inferiorly. Otherwise she has good middle aeration. The right ear appears normal.    Assessment/Plan:  Patient presents with left ear tinnitus and cracking and popping noises. I discussed with the patient that the cracking in the overall noises that she hears would be considered normal. It is not indicative that there is any pathologic issues going on. She certainly has no sign of infection present. Some of her tinnitus may be from mild degree of hearing loss. At this time, no intervention is required. Patient was given information and education.    I spent a total of 35 minutes face-to-face with Kamilah Perry during today's office visit.  Over 50% of this time was spent counseling the patient on and/or coordinating care as documented in my assessment and plan.        Again, thank you for allowing me to participate in the care of your patient.        Sincerely,        Marine Serrano MD

## 2018-01-22 NOTE — PROGRESS NOTES
Otolaryngology Adult Consultation    Patient: Kamilah Perry  : 1965      HPI:  Kamilah Perry is a 52 year old female seen today in the Otolaryngology Clinic for left ear popping, crackling, tinnitus.  Symptoms have been present for several months. She feels like it got worse after recent ear lavage for ear cleaning. Symptoms are on the left side. Symptoms while wax and wane. She denies any ear pain. She denies any dizziness. She does feel like her hearing is not as good bilaterally. She had an ear screening done with her primary care which showed potentially a mild hearing loss. She also notes that her primary told her that she had some scarring on her tympanic membrane.    Medications:  Current Outpatient Rx   Medication Sig Dispense Refill     citalopram (CELEXA) 10 MG tablet Take 10 mg by mouth daily  3     estradiol (VIVELLE-DOT) 0.075 MG/24HR 0.30mg       magnesium 200 MG TABS Take by mouth daily       PATADAY 0.2 % SOLN        Cholecalciferol (VITAMIN D) 1000 UNITS capsule Take 1 capsule by mouth daily.       Omega-3 Fatty Acids (FISH OIL) 1000 MG CPDR Take  by mouth daily.       levothyroxine (SYNTHROID, LEVOTHROID) 125 MCG tablet Take 125 mcg by mouth daily.       DAILY MULTIVITAMIN OR 1 Tablet daily         Allergies: Sulfa drugs     PMH:  Past Medical History:   Diagnosis Date     Hypothyroid        PSH:  Past Surgical History:   Procedure Laterality Date     D & C       GYN SURGERY  2011    partial hysterectomy       FH:  Family History   Problem Relation Age of Onset     CANCER Maternal Grandfather      skin     Hypertension Paternal Grandmother      Hypertension Paternal Grandfather      Congenital Anomalies Other      DIABETES No family hx of      C.A.D. No family hx of      Family History Negative No family hx of      Asthma No family hx of      CEREBROVASCULAR DISEASE No family hx of      Breast Cancer No family hx of      Cancer - colorectal No family hx of      Alcohol/Drug No family hx of       Alzheimer Disease No family hx of      Arthritis No family hx of      Eye Disorder No family hx of      HEART DISEASE No family hx of      Psychotic Disorder No family hx of      Hearing Loss No family hx of         SH:  Social History   Substance Use Topics     Smoking status: Never Smoker     Smokeless tobacco: Never Used     Alcohol use Yes      Comment: occasional       Review of Systems  No flowsheet data found.    Physical Exam:    GEN:  The patient is alert, oriented and in no acute distress.  HEAD:  Head, face scalp is grossly normal.  EARS:  Under the binocular microscope the ears were visualized. The left ear shows no significant cerumen present. The tympanic membrane is intact. There is a little myringosclerosis present inferiorly. Otherwise she has good middle aeration. The right ear appears normal.    Assessment/Plan:  Patient presents with left ear tinnitus and cracking and popping noises. I discussed with the patient that the cracking in the overall noises that she hears would be considered normal. It is not indicative that there is any pathologic issues going on. She certainly has no sign of infection present. Some of her tinnitus may be from mild degree of hearing loss. At this time, no intervention is required. Patient was given information and education.    I spent a total of 35 minutes face-to-face with Kamilah Perry during today's office visit.  Over 50% of this time was spent counseling the patient on and/or coordinating care as documented in my assessment and plan.

## 2018-03-13 ENCOUNTER — TELEPHONE (OUTPATIENT)
Dept: FAMILY MEDICINE | Facility: CLINIC | Age: 53
End: 2018-03-13

## 2018-03-19 ENCOUNTER — TRANSFERRED RECORDS (OUTPATIENT)
Dept: HEALTH INFORMATION MANAGEMENT | Facility: CLINIC | Age: 53
End: 2018-03-19

## 2018-06-05 DIAGNOSIS — E03.9 HYPOTHYROIDISM: Primary | ICD-10-CM

## 2018-06-05 LAB
T4 FREE SERPL-MCNC: 1.21 NG/DL (ref 0.76–1.46)
TSH SERPL DL<=0.005 MIU/L-ACNC: 0.77 MU/L (ref 0.4–4)

## 2018-06-05 PROCEDURE — 84443 ASSAY THYROID STIM HORMONE: CPT

## 2018-06-05 PROCEDURE — 84439 ASSAY OF FREE THYROXINE: CPT

## 2018-06-05 PROCEDURE — 36415 COLL VENOUS BLD VENIPUNCTURE: CPT

## 2018-06-21 ENCOUNTER — VIRTUAL VISIT (OUTPATIENT)
Dept: FAMILY MEDICINE | Facility: OTHER | Age: 53
End: 2018-06-21
Payer: COMMERCIAL

## 2018-06-21 DIAGNOSIS — R30.0 DYSURIA: ICD-10-CM

## 2018-06-21 DIAGNOSIS — R39.15 URGENCY OF URINATION: Primary | ICD-10-CM

## 2018-06-21 LAB
ALBUMIN UR-MCNC: NEGATIVE MG/DL
APPEARANCE UR: CLEAR
BILIRUB UR QL STRIP: NEGATIVE
COLOR UR AUTO: YELLOW
GLUCOSE UR STRIP-MCNC: NEGATIVE MG/DL
HGB UR QL STRIP: NEGATIVE
KETONES UR STRIP-MCNC: NEGATIVE MG/DL
LEUKOCYTE ESTERASE UR QL STRIP: NEGATIVE
NITRATE UR QL: NEGATIVE
PH UR STRIP: 6.5 PH (ref 5–7)
SOURCE: NORMAL
SP GR UR STRIP: 1.01 (ref 1–1.03)
UROBILINOGEN UR STRIP-ACNC: 0.2 EU/DL (ref 0.2–1)

## 2018-06-21 PROCEDURE — 98966 PH1 ASSMT&MGMT NQHP 5-10: CPT | Performed by: PHYSICIAN ASSISTANT

## 2018-06-21 PROCEDURE — 81003 URINALYSIS AUTO W/O SCOPE: CPT | Performed by: PHYSICIAN ASSISTANT

## 2018-06-21 NOTE — MR AVS SNAPSHOT
After Visit Summary   6/21/2018    Kamilah Perry    MRN: [id number]           Patient Information     Date Of Birth          1965        Visit Information        Provider Department      6/21/2018 2:00 PM Katy Dupont PA-C [facility]        Today's Diagnoses     Urgency of urination    -  1    Dysuria           Follow-ups after your visit        Who to contact     If you have questions or need follow up information about today's clinic visit or your schedule please contact [facility] directly at [phone].  Normal or non-critical lab and imaging results will be communicated to you by Thrillhart, letter or phone within 4 business days after the clinic has received the results. If you do not hear from us within 7 days, please contact the clinic through Thrillhart or phone. If you have a critical or abnormal lab result, we will notify you by phone as soon as possible.  Submit refill requests through Spotbros or call your pharmacy and they will forward the refill request to us. Please allow 3 business days for your refill to be completed.          Additional Information About Your Visit        MyChart Information     Spotbros gives you secure access to your electronic health record. If you see a primary care provider, you can also send messages to your care team and make appointments. If you have questions, please call your primary care clinic.  If you do not have a primary care provider, please call [phone] and they will assist you.        Care EveryWhere ID     This is your Care EveryWhere ID. This could be used by other organizations to access your [facility] medical records  [id number]         Blood Pressure from Last 3 Encounters:   01/22/18 133/82   01/05/18 102/70   01/03/17 114/74    Weight from Last 3 Encounters:   01/05/18 151 lb (68.5 kg)   09/08/17 150 lb (68 kg)   08/25/17 150 lb (68 kg)               Primary Care Provider Office Phone #  Fax #    Maricruz Moeller, APRN [facility] [phone] [address] [facility] [address]        Equal Access to Services     JAMARCUS LIRA : Hadfatemeh sanchez long Sobreeali, waangyda luqadaha, qaybta kaalmada adeolegario, macy khan laSlyjenny james. So [facility] [phone].    ATENCIÓN: Si habla español, tiene a sears disposición servicios gratuitos de asistencia lingüística. Llame al [phone].    We comply with applicable federal civil rights laws and [place] laws. We do not discriminate on the basis of race, color, national origin, age, disability, sex, sexual orientation, or gender identity.            Thank you!     Thank you for choosing [facility]  for your care. Our goal is always to provide you with excellent care. Hearing back from our patients is one way we can continue to improve our services. Please take a few minutes to complete the written survey that you may receive in the mail after your visit with us. Thank you!             Your Updated Medication List - Protect others around you: Learn how to safely use, store and throw away your medicines at www.disposemymeds.org.          This list is accurate as of 6/21/18  2:35 PM.  Always use your most recent med list.                   Brand Name Dispense Instructions for use Diagnosis    citalopram 10 MG tablet    celeXA     Take 10 mg by mouth daily        DAILY MULTIVITAMIN PO      1 Tablet daily        estradiol 0.075 MG/24HR BIW patch    VIVELLE-DOT     0.30mg        Fish Oil 1000 MG Cpdr      Take  by mouth daily.        levothyroxine 125 MCG tablet    SYNTHROID/LEVOTHROID     Take 125 mcg by mouth daily.        magnesium 200 MG Tabs      Take by mouth daily        PATADAY 0.2 % Soln   Generic drug:  olopatadine HCl           vitamin D 1000 units capsule      Take 1 capsule by mouth daily.

## 2018-06-21 NOTE — PROGRESS NOTES
"  HPI   Kamilah ePrry is a 53 year old female who is being evaluated via a telephone visit.      The patient has been notified of following:     \"This telephone visit will be conducted via a call between you and your physician/provider. We have found that certain health care needs can be provided without the need for a physical exam.  This service lets us provide the care you need with a short phone conversation.  If a prescription is necessary we can send it directly to your pharmacy.  If lab work is needed we can place an order for that and you can then stop by our lab to have the test done at a later time.    We will bill your insurance company for this service.  Please check with your medical insurance if this type of visit is covered. You may be responsible for the cost of this type of visit if insurance coverage is denied.  The typical cost is $30 (10min), $59 (11-20min) and $85 (21-30min).  Most often these visits are shorter than 10 minutes.    If during the course of the call the physician/provider feels a telephone visit is not appropriate, you will not be charged for this service.\"       Consent has been obtained for this service by care team member: yes.   See the scanned image in the medical record.    Kamilah Perry complains of  UTI      I have reviewed and updated the patient's Past Medical History, Social History, Family History and Medication List.    ALLERGIES  Sulfa drugs    Ca Simpson CMA    (MA signature)    Additional provider notes:     URINARY TRACT SYMPTOMS  Onset: slowly for 2 weeks     Description:   Painful urination (Dysuria): no   Blood in urine (Hematuria): no   Delay in urine (Hesitency): no     Intensity: mild    Progression of Symptoms:  worsening    Accompanying Signs & Symptoms:  Fever/chills: no   Flank pain YES, both   Nausea and vomiting: no   Any vaginal symptoms: none  Abdominal/Pelvic Pain: YES- see below     History:   History of frequent UTI's: no   History of kidney " stones: no   Sexually Active: YES  Possibility of pregnancy: No    Precipitating factors:   Unknown   Therapies Tried and outcome: Increase fluid intake and OTC advil or tylenol - for back ache helps     - Did home test from [employer] and both were positive   - Urgency, back, and side pain   - Cramping occasionally like ovary pain but mostly flank   - Had hysterectomy (still has ovaries)   - Takes hysterectomy patch      Recently increased dose   - Bowel movements been normal       Diagnostics  Results for orders placed or performed in visit on 06/21/18   *UA reflex to Microscopic and Culture ([facility] (except [place] and [place])   Result Value Ref Range    Color Urine Yellow     Appearance Urine Clear     Glucose Urine Negative NEG^Negative mg/dL    Bilirubin Urine Negative NEG^Negative    Ketones Urine Negative NEG^Negative mg/dL    Specific Gravity Urine 1.015 1.003 - 1.035    Blood Urine Negative NEG^Negative    pH Urine 6.5 5.0 - 7.0 pH    Protein Albumin Urine Negative NEG^Negative mg/dL    Urobilinogen Urine 0.2 0.2 - 1.0 EU/dL    Nitrite Urine Negative NEG^Negative    Leukocyte Esterase Urine Negative NEG^Negative    Source Midstream Urine        Assessment/Plan:    ICD-10-CM    1. Urgency of urination R39.15    2. Dysuria R30.0 *UA reflex to Microscopic and Culture ([facility] (except [place] and [place])     - Discussed labs as above, no signs of infection  - Discussed other possible etiology including muscle strain, side effects from recently increased estrogen patch, vaginal etiology, or constipation, most of these ruled out in our discussion   - Recommend monitoring   - Come into clinic if worsens and will recommend wet prep/pevlic exam   - Discussed warning signs that would warrant return to clinic         I have reviewed the note as documented above.  This accurately captures the substance of my conversation with the patient, Patel Dupont PA-C  [facility]       Total time of call between patient and provider was 6 minutes

## 2018-09-04 ENCOUNTER — TELEPHONE (OUTPATIENT)
Dept: FAMILY MEDICINE | Facility: CLINIC | Age: 53
End: 2018-09-04

## 2018-09-04 NOTE — TELEPHONE ENCOUNTER
Summary:    Patient is due/failing the following:   COLONOSCOPY, MAMMOGRAM and PAP    Action needed:   Patient needs office visit for PAP. and schedule a mammogram and colonoscopy or complete a FIT test     Type of outreach:    Phone, left message for patient to call back.     Questions for provider review:    None                                                                                                                                    Eloise Yi       Chart routed to Care Team .        Panel Management Review      Patient has the following on her problem list: None      Composite cancer screening  Chart review shows that this patient is due/due soon for the following Pap Smear, Mammogram and Colonoscopy

## 2018-09-27 ENCOUNTER — TELEPHONE (OUTPATIENT)
Dept: FAMILY MEDICINE | Facility: CLINIC | Age: 53
End: 2018-09-27

## 2018-09-27 NOTE — TELEPHONE ENCOUNTER
Reason for call:  Patient would like to talk to you regarding a possible sinus infection.  She is having right side of her head pain.  She has been doing this for a couple of weeks.  [facility] [employer]. Ok to leave a detailed message.

## 2018-10-17 ENCOUNTER — TELEPHONE (OUTPATIENT)
Dept: PHYSICAL THERAPY | Facility: CLINIC | Age: 53
End: 2018-10-17

## 2018-10-17 DIAGNOSIS — M54.2 CERVICAL PAIN: ICD-10-CM

## 2018-10-17 NOTE — TELEPHONE ENCOUNTER
Patient called to discuss previous HEP. Has had flare up of neck pain. Had massage and massage therapist feels it is her SCM.  Reviewed PTRx exercises previously performed with patient.  Patient instructed to see MD or return for formal PT visit if sx do not improve within the next 1-2 weeks or if sx increase.

## 2018-10-29 ENCOUNTER — TRANSFERRED RECORDS (OUTPATIENT)
Dept: HEALTH INFORMATION MANAGEMENT | Facility: CLINIC | Age: 53
End: 2018-10-29

## 2018-10-29 ENCOUNTER — PRE VISIT (OUTPATIENT)
Dept: OPHTHALMOLOGY | Facility: CLINIC | Age: 53
End: 2018-10-29

## 2018-10-29 ENCOUNTER — TELEPHONE (OUTPATIENT)
Dept: LAB | Facility: CLINIC | Age: 53
End: 2018-10-29

## 2018-10-29 DIAGNOSIS — E30.9 DISORDER OF PUBERTY: Primary | ICD-10-CM

## 2018-10-29 DIAGNOSIS — E03.9 HYPOTHYROIDISM, ADULT: ICD-10-CM

## 2018-10-29 LAB
T4 FREE SERPL-MCNC: 1.36 NG/DL (ref 0.76–1.46)
TSH SERPL DL<=0.005 MIU/L-ACNC: 0.16 MU/L (ref 0.4–4)

## 2018-10-29 PROCEDURE — 84439 ASSAY OF FREE THYROXINE: CPT | Performed by: INTERNAL MEDICINE

## 2018-10-29 PROCEDURE — 36415 COLL VENOUS BLD VENIPUNCTURE: CPT | Performed by: INTERNAL MEDICINE

## 2018-10-29 PROCEDURE — 84443 ASSAY THYROID STIM HORMONE: CPT | Performed by: INTERNAL MEDICINE

## 2018-10-29 RX ORDER — OLOPATADINE HYDROCHLORIDE 1 MG/ML
1 SOLUTION/ DROPS OPHTHALMIC 2 TIMES DAILY
COMMUNITY
Start: 2018-09-16 | End: 2018-11-13

## 2018-10-29 RX ORDER — LEVOTHYROXINE SODIUM 112 UG/1
1 TABLET ORAL DAILY
Refills: 3 | COMMUNITY
Start: 2018-09-04

## 2018-10-29 NOTE — TELEPHONE ENCOUNTER
Notify pt. We would be happy to manage her thyroid, but she would need an OV for this. Maricruz Moeller

## 2018-10-29 NOTE — TELEPHONE ENCOUNTER
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 hx of punctal plugs           Current Outpatient Prescriptions   Medication Sig Dispense Refill     levothyroxine (SYNTHROID/LEVOTHROID) 112 MCG tablet Take 1 tablet by mouth daily  3     olopatadine (PATANOL) 0.1 % ophthalmic solution Place 1 drop into both eyes 2 times daily       Cholecalciferol (VITAMIN D) 1000 UNITS capsule Take 1 capsule by mouth daily.       citalopram (CELEXA) 10 MG tablet Take 10 mg by mouth daily  3     DAILY MULTIVITAMIN OR 1 Tablet daily       estradiol (VIVELLE-DOT) 0.075 MG/24HR 0.30mg       levothyroxine (SYNTHROID, LEVOTHROID) 125 MCG tablet Take 125 mcg by mouth daily.       magnesium 200 MG TABS Take by mouth daily       Omega-3 Fatty Acids (FISH OIL) 1000 MG CPDR Take  by mouth daily.       PATADAY 0.2 % PRAKASH WALKER. COA. [facility]

## 2018-10-29 NOTE — TELEPHONE ENCOUNTER
Ernie Alcantara,    This patient was in today for lab. She has written orders from Sanjay Chinchilla, Endocrinology.    She is wondering if you can review her t4 free & tsh results so she can possibly skip [place] to the cities.    Please let her know.    Thank you,  Kaitlin

## 2018-10-30 ENCOUNTER — TELEPHONE (OUTPATIENT)
Dept: FAMILY MEDICINE | Facility: CLINIC | Age: 53
End: 2018-10-30

## 2018-10-30 NOTE — TELEPHONE ENCOUNTER
Kamilah Perry is a 53 year old female who calls with swelling from lab draw.    NURSING ASSESSMENT:  Description:  Tried the right arm first, hurt, took out needle and was able to get blood from left without any problems. Elbow hurts, a little swelling and discomfort radiates up the arm a little.   Onset/duration:  Yesterday  Precip. factors:  Lab draw    Allergies:   Allergies   Allergen Reactions     Sulfa Drugs Hives     RECOMMENDED DISPOSITION:  Home care advice - If not better in the next day or 2 the pt should be seen.  Will comply with recommendation: Yes  If further questions/concerns or if symptoms do not improve, worsen or new symptoms develop, call your PCP or [facility] as soon as possible.      Guideline used:  Telephone Triage Protocols for Nurses, Fifth Edition, Zakiya Tobin RN

## 2018-10-31 ENCOUNTER — TELEPHONE (OUTPATIENT)
Dept: OPHTHALMOLOGY | Facility: CLINIC | Age: 53
End: 2018-10-31

## 2018-10-31 ENCOUNTER — OFFICE VISIT (OUTPATIENT)
Dept: OPHTHALMOLOGY | Facility: CLINIC | Age: 53
End: 2018-10-31
Payer: COMMERCIAL

## 2018-10-31 DIAGNOSIS — L98.0 PYOGENIC GRANULOMA: ICD-10-CM

## 2018-10-31 DIAGNOSIS — H04.332 ACUTE CANALICULITIS OF LEFT LACRIMAL PASSAGE: Primary | ICD-10-CM

## 2018-10-31 PROCEDURE — 99243 OFF/OP CNSLTJ NEW/EST LOW 30: CPT | Mod: 25 | Performed by: OPHTHALMOLOGY

## 2018-10-31 PROCEDURE — 68530 CLEARANCE OF TEAR DUCT: CPT | Mod: LT | Performed by: OPHTHALMOLOGY

## 2018-10-31 PROCEDURE — 88304 TISSUE EXAM BY PATHOLOGIST: CPT | Performed by: OPHTHALMOLOGY

## 2018-10-31 PROCEDURE — 68815 PROBE NASOLACRIMAL DUCT: CPT | Mod: 51 | Performed by: OPHTHALMOLOGY

## 2018-10-31 RX ORDER — OFLOXACIN 3 MG/ML
1 SOLUTION/ DROPS OPHTHALMIC 3 TIMES DAILY
COMMUNITY
Start: 2018-10-19 | End: 2019-08-12

## 2018-10-31 ASSESSMENT — SLIT LAMP EXAM - LIDS: COMMENTS: NORMAL

## 2018-10-31 ASSESSMENT — VISUAL ACUITY
OS_CC: 20/20
CORRECTION_TYPE: GLASSES
METHOD: SNELLEN - LINEAR
OD_CC: 20/20

## 2018-10-31 NOTE — MR AVS SNAPSHOT
After Visit Summary   10/31/2018    Kamilah Perry    MRN: [id number]           Patient Information     Date Of Birth          1965        Visit Information        Provider Department      10/31/2018 8:15 AM Jazmyne Marmolejo MD [facility]        Care Instructions    Post- operative Instructions   Ophthalmic Plastic and Reconstructive Surgery    Jazmyne Marmolejo M.D.    All instructions apply to the operated eye(s) or eyelid(s).    Wound care and person care:    If a patch or bandage has been placed, please leave this in place until seen by your physician. Ensure that the bandage does not get wet when you take a shower.    Apply ice compresses 15 minutes on 15 minutes off while awake for 2 days. Then switch to warm water compresses 4 times daily until seen by your physician. Form warm packs you can place a cup of dry uncooked  Rice in a clean cotton sock. Then microwave for 30 seconds to one minute. Next place the warm sock into a plastic bag and wrap the bag with clean warm wet washcloth and place over operated eye.    You may shower or wash your hair the day after surgery. Do not bathe or go swimming for 1 week to prevent contamination of your wounds.    Do not apply make-up to the eyes or eyelids for 2 weeks after surgery.    Expect some swelling, bruising, black eye (even into the lower eyelids and cheeks). Also expect serum caking, crusting and discharge from the eye and/or incisions. A small amount of surface bleeding is normal for the first 48 hours.     Your eye(s) and eyelid(s) may be painful and tender. This is normal after surgery.  Use the pain medication as prescribed. If your pain does not improve despite the medication, contact the office.    Contact information and follow-up:    Return to the Eye Clinic for a follow-up appointment with your physician as scheduled. If no appointment has been scheduled:    If you are seen at the [facility], call  [phone] for an appointment with Dr. Marmolejo within 1 to 2 weeks from your date of surgery.    If you are seen at [facility], you can call [phone] for an appointment with Dr. Marmolejo within 1 to 2 weeks from the date of your surgery.      For severe pain, bleeding, or loss of vision, call the [facility] at [phone] or [facility] at [phone].    After hours or on weekends and holidays, call [phone] and ask to speak with the ophthalmologist on call.      An on call person can be reached after hours for concerns. The on call doctor should not call in medication refill requests after hours or on weekends, so please plan accordingly. An effort has been made to provide adequate pain medication following every surgery, and refills will not be provided in most instances. Narcotic pain medications can not be called in.    Activity restrictions and driving:    Avoid heavy lifting, bending, exercise  or strenuous activity for 1 week after surgery.  You may resume other activities and return to work as tolerated.    You may not resume driving until you have stopped using narcotic pain medications (such as Norco, Percocet, Tylenol #3).    Medications:    Restart all of your regular home medications and eye drops. If you take Plavix or Aspirin on a regular basis, wait for 72 hours after surgery before restarting these in order to decrease the risk of bleeding complications.    Avoid aspirin and aspirin-like medications (Motrin, Aleve, Ibuprofen, Sivan-[place] etc) for 72 hours to reduce the risk of bleeding. You may take Tylenol (acetaminophen) for pain.    In addition to your home medications, take the following post-operative medications as prescribed by your physician.      Apply antibiotic ointment to all surfaces threes times a day and into the operated eye(s) at night.    Instill eye drops 3 times a day for 10 days.    Take antibiotic capsules  or tablets as directed.    Take pain pills at prescribed frequency as needed for pain.    WARNING:  All prescription medications listed above contain Tylenol (acetaminophen). You must not take more than 4,000mg of acetaminophen per 24-hour period. This is equivalent to 6 tablets of Darvocet, 12 tablets of Norco, Percocet or Tylenol #3. If you take other over the counter medications containing acetaminophen, you must take the amount of acetaminophen into account and reduce the number of prescribed pain pills accordingly.  The prescribed medications my make you drowsy. You must not drive a car, operate heavy machinery or drink alcohol while taking them.  The prescribed pain medications may cause constipation and nausea. Take them with food to prevent stomach upset. If you continue to experience nausea, call you physician.              Follow-ups after your visit        Follow-up notes from your care team     Return in about 2 weeks (around 11/14/2018).      Your next 10 appointments already scheduled     Nov 14, 2018  9:15 AM CST   Return Visit with Jazmyne Marmolejo MD   [facility]    [id number] [place] [id number] [phone]              Who to contact     If you have questions or need follow up information about today's clinic visit or your schedule please contact [facility] directly at [phone].  Normal or non-critical lab and imaging results will be communicated to you by MyChart, letter or phone within 4 business days after the clinic has received the results. If you do not hear from us within 7 days, please contact the clinic through MyChart or phone. If you have a critical or abnormal lab result, we will notify you by phone as soon as possible.  Submit refill requests through [employer] or call your pharmacy and they will forward the refill request to us. Please allow 3 business days for your refill to be completed.           Additional Information About Your Visit        [employer] Information     [employer] gives you secure access to your electronic health record. If you see a primary care provider, you can also send messages to your care team and make appointments. If you have questions, please call your primary care clinic.  If you do not have a primary care provider, please call [phone] and they will assist you.      [employer] is an electronic gateway that provides easy, online access to your medical records. With [employer], you can request a clinic appointment, read your test results, renew a prescription or communicate with your care team.     To access your existing account, please contact your [facility] or call [phone] for assistance.        Care EveryWhere ID     This is your Care EveryWhere ID. This could be used by other organizations to access your [facility] medical records  [id number]         Blood Pressure from Last 3 Encounters:   01/22/18 133/82   01/05/18 102/70   01/03/17 114/74    Weight from Last 3 Encounters:   01/05/18 68.5 kg (151 lb)   09/08/17 68 kg (150 lb)   08/25/17 68 kg (150 lb)              Today, you had the following     No orders found for display       Primary Care Provider Office Phone # Fax #    DOMINIC Yin [facility] [phone] [address] [facility] [address]        Equal Access to Services     JAMARCUS LIRA : Hadii aad ku hadasho Soomaali, waaxda luqadaha, qaybta kaalmada adeegyada, macy james. So [facility] [phone].    ATENCIÓN: Si habla español, tiene a sears disposición servicios gratuitos de asistencia lingüística. Llame al [phone].    We comply with applicable federal civil rights laws and [place] laws. We do not discriminate on the basis of race, color, national origin, age, disability, sex, sexual orientation, or gender identity.            Thank you!     Thank you for choosing [facility]  for your care. Our goal is always to provide you with excellent care. Hearing back from our patients is one way we can continue to improve our services. Please take a few minutes to complete the written survey that you may receive in the mail after your visit with us. Thank you!             Your Updated Medication List - Protect others around you: Learn how to safely use, store and throw away your medicines at www.disposemymeds.org.          This list is accurate as of 10/31/18  8:47 AM.  Always use your most recent med list.                   Brand Name Dispense Instructions for use Diagnosis    citalopram 10 MG tablet    celeXA     Take 10 mg by mouth daily        DAILY MULTIVITAMIN PO      1 Tablet daily        estradiol 0.075 MG/24HR BIW patch    VIVELLE-DOT     0.30mg        Fish Oil 1000 MG Cpdr      Take  by mouth daily.        * levothyroxine 125 MCG tablet    SYNTHROID/LEVOTHROID     Take 125 mcg by mouth daily.        * levothyroxine 112 MCG tablet    SYNTHROID/LEVOTHROID     Take 1 tablet by mouth daily        magnesium 200 MG Tabs      Take by mouth daily        ofloxacin 0.3 % ophthalmic solution    OCUFLOX     Place 1 drop Into the left eye 3 times daily        * PATADAY 0.2 % Soln   Generic drug:  olopatadine HCl           * olopatadine 0.1 % ophthalmic solution    PATANOL     Place 1 drop into both eyes 2 times daily        vitamin D 1000 units capsule      Take 1 capsule by mouth daily.        * Notice:  This list has 4 medication(s) that are the same as other medications prescribed for you. Read the directions carefully, and ask your doctor or other care provider to review them with you.

## 2018-10-31 NOTE — Clinical Note
Ernie Fisher, having trouble with the codes, there are too many codes that can be used with this. I was thinking [id number] [id number]. Thanks!

## 2018-10-31 NOTE — LETTER
10/31/2018         RE:  :  MRN: Kamilah Perry  1965  [id number]     Dear Dr. Lee Ann Evans,    Thank you for asking me to see your patient, Kamilah Perry, for an oculoplastic   consultation.  My assessment and plan are below.  For further details, please see my attached clinic note.          Chief Complaints and History of Present Illnesses   Patient presents with     Granuloma Of Conjunctiva Evaluation     hx of punctal plugs, for the last month has had a lump, with tearing, there is scar tissue coming out     2 years ago had punctal plugs, likely intracanalicular. Did very well until began developing swelling on the left lower eyelid about one month ago. She now gets constant irritation and discharge and it is blood tinged.     Assessment & Plan     Kamilah Perry is a 53 year old female with the following diagnoses:   1. Acute canaliculitis of left lacrimal passage    2. Pyogenic granuloma       Discussed options. I did try to squeeze the plug out but this was unsuccessful. Recommend canaliculotomy, excising foreign body and pyogenic granuloma placement of monocanalicular stent.     She would like to proceed today if possible    Preoperative diagnosis: Left lower eyelid canaliculitis with presumed retained punctal plug and pyogenic granuloma    Postoperative diagnosis: Left lower eyelid canaliculitis with retained punctal plug and pyogenic granuloma    Procedure performed: left canaliculi to me removal of retained punctal plug excision of pyogenic granuloma, reconstruction of canaliculus laceration and mono canalicular nasolacrimal duct stent    Surgeon: Jazmyne Marmolejo MD    Anesthesia: 1% lidocaine with epinephrine    Specimen: Left canaliculus foreign body, and adjacent pyogenic granuloma    Description of procedure:  Kamilah Perry was laid supine on the procedure chair.  Topical anesthetic was applied.  The left lower eyelid in the area of the punctum and canaliculus was infiltrated with local  anesthetic as above.  She was prepped and draped in the typical sterile fashion.  A Lazaro scissor was used to incise the punctum into the canaliculus.  With this being enlarged direct pressure was applied to either side of the eyelid and a retained foreign body was expressed, consistent with an intracanalicular plug.  There was a significant amount of granulomatous tissue on either side and this was removed.  All of this material was placed in formalin and sent to pathology.  A small chalazion curette was used to curette the canaliculus to remove any remaining tissue.  Saline was used to irrigate through a cannula and her nasolacrimal system was found to be widely patent at this point.  Is a minimal [place] mono canalicular stent was threaded through the punctum into the canaliculus and into her lacrimal sac.  The pericanalicular tissue was then reconstructed using interrupted 6-0 plain gut sutures around the mono canalicular stent. She tolerated the procedure well. She will return in several weeks.    I was present for the entire procedure. Jazmyne Marmolejo MD            Again, thank you for allowing me to participate in the care of your patient.      Sincerely,    Jazmyne Marmolejo MD  Department of Ophthalmology and Visual Neurosciences  [facility]    CC: Lee Ann Evans, OD  Eye [facility]  [address] [facility] [address]  VIA Facsimile: [phone]

## 2018-10-31 NOTE — PROGRESS NOTES
Chief Complaints and History of Present Illnesses   Patient presents with     Granuloma Of Conjunctiva Evaluation     hx of punctal plugs, for the last month has had a lump, with tearing, there is scar tissue coming out     2 years ago had punctal plugs, likely intracanalicular. Did very well until began developing swelling on the left lower eyelid about one month ago. She now gets constant irritation and discharge and it is blood tinged.     Assessment & Plan     Kamilah Perry is a 53 year old female with the following diagnoses:   1. Acute canaliculitis of left lacrimal passage    2. Pyogenic granuloma       Discussed options. I did try to squeeze the plug out but this was unsuccessful. Recommend canaliculotomy, excising foreign body and pyogenic granuloma placement of monocanalicular stent.     She would like to proceed today if possible    Preoperative diagnosis: Left lower eyelid canaliculitis with presumed retained punctal plug and pyogenic granuloma    Postoperative diagnosis: Left lower eyelid canaliculitis with retained punctal plug and pyogenic granuloma    Procedure performed: left canaliculi to me removal of retained punctal plug excision of pyogenic granuloma, reconstruction of canaliculus laceration and mono canalicular nasolacrimal duct stent    Surgeon: Jazmyne Marmolejo MD    Anesthesia: 1% lidocaine with epinephrine    Specimen: Left canaliculus foreign body, and adjacent pyogenic granuloma    Description of procedure:  Kamilah Perry was laid supine on the procedure chair.  Topical anesthetic was applied.  The left lower eyelid in the area of the punctum and canaliculus was infiltrated with local anesthetic as above.  She was prepped and draped in the typical sterile fashion.  A Lazaro scissor was used to incise the punctum into the canaliculus.  With this being enlarged direct pressure was applied to either side of the eyelid and a retained foreign body was expressed, consistent with an  intracanalicular plug.  There was a significant amount of granulomatous tissue on either side and this was removed.  All of this material was placed in formalin and sent to pathology.  A small chalazion curette was used to curette the canaliculus to remove any remaining tissue.  Saline was used to irrigate through a cannula and her nasolacrimal system was found to be widely patent at this point.  Is a minimal [place] mono canalicular stent was threaded through the punctum into the canaliculus and into her lacrimal sac.  The pericanalicular tissue was then reconstructed using interrupted 6-0 plain gut sutures around the mono canalicular stent. She tolerated the procedure well. She will return in several weeks.    I was present for the entire procedure. Jazmyne Marmolejo MD             Attending Physician Attestation:  Complete documentation of historical and exam elements from today's encounter can be found in the full encounter summary report (not reduplicated in this progress note).  I personally obtained the chief complaint(s) and history of present illness.  I confirmed and edited as necessary the review of systems, past medical/surgical history, family history, social history, and examination findings as documented by others; and I examined the patient myself.  I personally reviewed the relevant tests, images, and reports as documented above.  I formulated and edited as necessary the assessment and plan and discussed the findings and management plan with the patient and family. - Jazmyne Marmolejo MD

## 2018-10-31 NOTE — NURSING NOTE
Patient presents with:  Granuloma Of Conjunctiva Evaluation: hx of punctal plugs, for the last month has had a lump, with tearing, there is scar tissue coming out      Referring Provider:  Lee Ann Evans, OD  EYE [facility]  [address] [place] [address]        Elana WALKER. COA. OSC

## 2018-10-31 NOTE — PATIENT INSTRUCTIONS
Post- operative Instructions   Ophthalmic Plastic and Reconstructive Surgery    Jazmyne Marmloejo M.D.    All instructions apply to the operated eye(s) or eyelid(s).    Wound care and person care:    If a patch or bandage has been placed, please leave this in place until seen by your physician. Ensure that the bandage does not get wet when you take a shower.    Apply ice compresses 15 minutes on 15 minutes off while awake for 2 days. Then switch to warm water compresses 4 times daily until seen by your physician. Form warm packs you can place a cup of dry uncooked  Rice in a clean cotton sock. Then microwave for 30 seconds to one minute. Next place the warm sock into a plastic bag and wrap the bag with clean warm wet washcloth and place over operated eye.    You may shower or wash your hair the day after surgery. Do not bathe or go swimming for 1 week to prevent contamination of your wounds.    Do not apply make-up to the eyes or eyelids for 2 weeks after surgery.    Expect some swelling, bruising, black eye (even into the lower eyelids and cheeks). Also expect serum caking, crusting and discharge from the eye and/or incisions. A small amount of surface bleeding is normal for the first 48 hours.     Your eye(s) and eyelid(s) may be painful and tender. This is normal after surgery.  Use the pain medication as prescribed. If your pain does not improve despite the medication, contact the office.    Contact information and follow-up:    Return to the Eye Clinic for a follow-up appointment with your physician as scheduled. If no appointment has been scheduled:    If you are seen at the [facility], call [phone] for an appointment with Dr. Marmolejo within 1 to 2 weeks from your date of surgery.    If you are seen at [facility], you can call [phone] for an appointment with Dr. Marmolejo within 1 to 2 weeks from the date of your surgery.      For severe pain, bleeding, or loss of  vision, call the [facility] at [phone] or [facility] at [phone].    After hours or on weekends and holidays, call [phone] and ask to speak with the ophthalmologist on call.      An on call person can be reached after hours for concerns. The on call doctor should not call in medication refill requests after hours or on weekends, so please plan accordingly. An effort has been made to provide adequate pain medication following every surgery, and refills will not be provided in most instances. Narcotic pain medications can not be called in.    Activity restrictions and driving:    Avoid heavy lifting, bending, exercise  or strenuous activity for 1 week after surgery.  You may resume other activities and return to work as tolerated.    You may not resume driving until you have stopped using narcotic pain medications (such as Norco, Percocet, Tylenol #3).    Medications:    Restart all of your regular home medications and eye drops. If you take Plavix or Aspirin on a regular basis, wait for 72 hours after surgery before restarting these in order to decrease the risk of bleeding complications.    Avoid aspirin and aspirin-like medications (Motrin, Aleve, Ibuprofen, Sivan-[place] etc) for 72 hours to reduce the risk of bleeding. You may take Tylenol (acetaminophen) for pain.    In addition to your home medications, take the following post-operative medications as prescribed by your physician.      Apply antibiotic ointment to all surfaces threes times a day and into the operated eye(s) at night.    Instill eye drops 3 times a day for 10 days.    Take antibiotic capsules or tablets as directed.    Take pain pills at prescribed frequency as needed for pain.    WARNING:  All prescription medications listed above contain Tylenol (acetaminophen). You must not take more than 4,000mg of acetaminophen per 24-hour period. This is equivalent to 6 tablets of Darvocet, 12 tablets of  Norco, Percocet or Tylenol #3. If you take other over the counter medications containing acetaminophen, you must take the amount of acetaminophen into account and reduce the number of prescribed pain pills accordingly.  The prescribed medications my make you drowsy. You must not drive a car, operate heavy machinery or drink alcohol while taking them.  The prescribed pain medications may cause constipation and nausea. Take them with food to prevent stomach upset. If you continue to experience nausea, call you physician.

## 2018-10-31 NOTE — TELEPHONE ENCOUNTER
[facility] Call Center    Phone Message    May a detailed message be left on voicemail: yes    Reason for Call: Dr. Samano left patient a message telling her to use the ointment she was given.  She did not receive any ointment at her visit today.  Requesting a call to discuss as soon as possible.  Thank you.     Action Taken: Message routed to:  Adult Clinics: Eye p [id number]

## 2018-11-01 NOTE — TELEPHONE ENCOUNTER
Patient called clinic asking for an update on the request below. She is requesting that Elana give her a call when she is able. Thank you

## 2018-11-01 NOTE — TELEPHONE ENCOUNTER
Concerned about the fact that she did not receive any ointment after the procedure yesterday and that when Dr Samano called her he said she should just use the ointment and put her cl in if the eye was feeling scratchy.   Reminded her that we did not give her any ointment due to the fact that she was already on an abx gtt (ofloxacin). He advised her to continue that gtt tid and if her eye did feel scratchy she could put her contact lens in.    Pt verbalized understanding and agreed with this plan  Elana HOGUE. OSC

## 2018-11-03 ENCOUNTER — TELEPHONE (OUTPATIENT)
Dept: OPHTHALMOLOGY | Facility: CLINIC | Age: 53
End: 2018-11-03

## 2018-11-05 ENCOUNTER — TELEPHONE (OUTPATIENT)
Dept: OTOLARYNGOLOGY | Facility: CLINIC | Age: 53
End: 2018-11-05

## 2018-11-05 NOTE — TELEPHONE ENCOUNTER
Phone call received from pt stating mini manoka came out on Friday, pt spoke with resident this morning who suggested she call this clinic.  Per Dr Samano, no replacement of mini manoka necessary at this time, pt ok to wait until previously scheduled appointment on 11/14.  Pt verbalized understanding of plan.  She states she will continue antibiotic eye drops, and will call back if concerns or questions arise.  Ca Lisa RN

## 2018-11-06 LAB — COPATH REPORT: NORMAL

## 2018-11-14 ENCOUNTER — OFFICE VISIT (OUTPATIENT)
Dept: OPHTHALMOLOGY | Facility: CLINIC | Age: 53
End: 2018-11-14
Payer: COMMERCIAL

## 2018-11-14 DIAGNOSIS — H04.332 ACUTE CANALICULITIS OF LEFT LACRIMAL PASSAGE: Primary | ICD-10-CM

## 2018-11-14 DIAGNOSIS — L98.0 PYOGENIC GRANULOMA: ICD-10-CM

## 2018-11-14 PROCEDURE — 99213 OFFICE O/P EST LOW 20 MIN: CPT | Performed by: OPHTHALMOLOGY

## 2018-11-14 ASSESSMENT — VISUAL ACUITY
OS_CC: 20/20
METHOD: SNELLEN - LINEAR
OD_CC: 20/20

## 2018-11-14 ASSESSMENT — SLIT LAMP EXAM - LIDS: COMMENTS: NORMAL

## 2018-11-14 ASSESSMENT — CONF VISUAL FIELD
OS_NORMAL: 1
OD_NORMAL: 1

## 2018-11-14 NOTE — PROGRESS NOTES
Chief Complaints and History of Present Illnesses   Patient presents with     Follow Up For     S/P left canaliculi to me removal of retained punctal plug excision of pyogenic granuloma, reconstruction of canaliculus laceration and mono canalicular nasolacrimal duct stent DOSx: 10/31/2018     Kamilah Perry is here for follow-up of canaliculitis possibly secondary to retained punctal plug.  She underwent canaliculotomy, expression of retained material, and nasolacrimal duct stenting.  Pathology was consistent with pyogenic granuloma.  She has healed well.  She does not have trouble with tearing, and dry eye has not been a recurrent issue.  Her right eye feels like her left eye now.  Her stent worked its way out about 3 days after the procedure.    Assessment & sarah     Kamilah Perry is a 53 year old female with the following diagnoses:   1. Acute canaliculitis of left lacrimal passage    2. Pyogenic granuloma       She has healed nicely.  I asked her to continue using warm compresses.  She will follow-up with me on an as-needed basis.       Attending Physician Attestation:  Complete documentation of historical and exam elements from today's encounter can be found in the full encounter summary report (not reduplicated in this progress note).  I personally obtained the chief complaint(s) and history of present illness.  I confirmed and edited as necessary the review of systems, past medical/surgical history, family history, social history, and examination findings as documented by others; and I examined the patient myself.  I personally reviewed the relevant tests, images, and reports as documented above.  I formulated and edited as necessary the assessment and plan and discussed the findings and management plan with the patient and family. - Jazmyne Marmolejo MD      P

## 2018-11-14 NOTE — NURSING NOTE
Patient presents with:  Follow Up For: S/P left canaliculi to me removal of retained punctal plug excision of pyogenic granuloma, reconstruction of canaliculus laceration and mono canalicular nasolacrimal duct stent DOSx: 10/31/2018      Referring Provider:  No referring provider defined for this encounter.    HPI    Informant(s):  pt   Symptoms:              Comments:  Stent fell out on 11/02/2018  Patient took last dose of ofloxacin on Tuesday, 11/13  Patient is concerned about minor redness on left outer lower lid and also concerned that there might be scar tissue in left punctum  Patient states she is not experiencing excessive tearing or dryness and eye comfort is ok in left                   Myra Washington, COA

## 2018-11-14 NOTE — NURSING NOTE
Patient presents with:  Follow Up For: S/P left canaliculi to me removal of retained punctal plug excision of pyogenic granuloma, reconstruction of canaliculus laceration and mono canalicular nasolacrimal duct stent DOSx: 10/31/2018      Referring Provider:  No referring provider defined for this encounter.    HPI    Informant(s):  pt   Symptoms:              Comments:  Stent fell out on 11/02/2018  Patient took last dose of ofloxacin on Tuesday, 11/13  Patient is concerned about minor redness on outer lid and if there might be scar tissue  Patient states she is not experiencing excessive tearing or dryness and eye comfort is ok                   Myra Washington, COA

## 2018-11-14 NOTE — MR AVS SNAPSHOT
After Visit Summary   11/14/2018    Kamilah Perry    MRN: [id number]           Patient Information     Date Of Birth          1965        Visit Information        Provider Department      11/14/2018 9:15 AM Jazmyne Marmolejo MD [facility]        Today's Diagnoses     Acute canaliculitis of left lacrimal passage    -  1    Pyogenic granuloma           Follow-ups after your visit        Follow-up notes from your care team     Return if symptoms worsen or fail to improve.      Who to contact     If you have questions or need follow up information about today's clinic visit or your schedule please contact [facility] directly at [phone].  Normal or non-critical lab and imaging results will be communicated to you by MyChart, letter or phone within 4 business days after the clinic has received the results. If you do not hear from us within 7 days, please contact the clinic through MyChart or phone. If you have a critical or abnormal lab result, we will notify you by phone as soon as possible.  Submit refill requests through Cornice or call your pharmacy and they will forward the refill request to us. Please allow 3 business days for your refill to be completed.          Additional Information About Your Visit        MyChart Information     Cornice gives you secure access to your electronic health record. If you see a primary care provider, you can also send messages to your care team and make appointments. If you have questions, please call your primary care clinic.  If you do not have a primary care provider, please call [phone] and they will assist you.      Cornice is an electronic gateway that provides easy, online access to your medical records. With Cornice, you can request a clinic appointment, read your test results, renew a prescription or communicate with your care team.     To access your existing account, please contact your [facility]  [facility] or call [phone] for assistance.        Care EveryWhere ID     This is your Care EveryWhere ID. This could be used by other organizations to access your [facility] medical records  [id number]         Blood Pressure from Last 3 Encounters:   01/22/18 133/82   01/05/18 102/70   01/03/17 114/74    Weight from Last 3 Encounters:   01/05/18 68.5 kg (151 lb)   09/08/17 68 kg (150 lb)   08/25/17 68 kg (150 lb)              Today, you had the following     No orders found for display       Primary Care Provider Office Phone # Fax #    Maricruz DOMINIC [facility] [phone] [address] [facility] [id number]        Equal Access to Services     JAMARCUS LIRA : Hadii stephanie sanchez hadasho Soomaali, waaxda luqadaha, qaybta kaalmada adeegyada, macy arriolain hayjenny medina . So [facility] [phone].    ATENCIÓN: Si habla español, tiene a sears disposición servicios gratuitos de asistencia lingüística. Llame al [phone].    We comply with applicable federal civil rights laws and [place] laws. We do not discriminate on the basis of race, color, national origin, age, disability, sex, sexual orientation, or gender identity.            Thank you!     Thank you for choosing [facility]  for your care. Our goal is always to provide you with excellent care. Hearing back from our patients is one way we can continue to improve our services. Please take a few minutes to complete the written survey that you may receive in the mail after your visit with us. Thank you!             Your Updated Medication List - Protect others around you: Learn how to safely use, store and throw away your medicines at www.disposemymeds.org.          This list is accurate as of 11/14/18 10:42 AM.  Always use your most recent med list.                   Brand Name Dispense Instructions for use Diagnosis    citalopram 10 MG tablet    celeXA     Take 10 mg by mouth daily        DAILY  MULTIVITAMIN PO      1 Tablet daily        estradiol 0.075 MG/24HR BIW patch    VIVELLE-DOT     0.30mg        Fish Oil 1000 MG Cpdr      Take  by mouth daily.        * levothyroxine 125 MCG tablet    SYNTHROID/LEVOTHROID     Take 125 mcg by mouth daily.        * levothyroxine 112 MCG tablet    SYNTHROID/LEVOTHROID     Take 1 tablet by mouth daily        magnesium 200 MG Tabs      Take by mouth daily        ofloxacin 0.3 % ophthalmic solution    OCUFLOX     Place 1 drop Into the left eye 3 times daily        PATADAY 0.2 % Soln   Generic drug:  olopatadine HCl           vitamin D 1000 units capsule      Take 1 capsule by mouth daily.        * Notice:  This list has 2 medication(s) that are the same as other medications prescribed for you. Read the directions carefully, and ask your doctor or other care provider to review them with you.

## 2018-11-14 NOTE — LETTER
2018         RE:  :  MRN: Kamilah Perry  1965  [id number]     Dear Dr. Evans,    Your patient, Kamilah Perry, returned for oculoplastic follow up. My assessment and plan are below.  For further details, please see my attached clinic note.          Chief Complaints and History of Present Illnesses   Patient presents with     Follow Up For     S/P left canaliculi to me removal of retained punctal plug excision of pyogenic granuloma, reconstruction of canaliculus laceration and mono canalicular nasolacrimal duct stent DOSx: 10/31/2018     Kamilah Perry is here for follow-up of canaliculitis possibly secondary to retained punctal plug.  She underwent canaliculotomy, expression of retained material, and nasolacrimal duct stenting.  Pathology was consistent with pyogenic granuloma.  She has healed well.  She does not have trouble with tearing, and dry eye has not been a recurrent issue.  Her right eye feels like her left eye now.  Her stent worked its way out about 3 days after the procedure.    Assessment & sarah     Kamilah Perry is a 53 year old female with the following diagnoses:   1. Acute canaliculitis of left lacrimal passage    2. Pyogenic granuloma       She has healed nicely.  I asked her to continue using warm compresses.  She will follow-up with me on an as-needed basis.      Again, thank you for allowing me to participate in the care of your patient.      Sincerely,    Jazmyne Marmolejo MD  Department of Ophthalmology and Visual Neurosciences  [facility]      CC: Lee Ann Evans, OD  [facility]  [id number] [place] [id number]  VIA Facsimile: [phone]

## 2018-12-05 ENCOUNTER — TELEPHONE (OUTPATIENT)
Dept: FAMILY MEDICINE | Facility: CLINIC | Age: 53
End: 2018-12-05

## 2018-12-05 NOTE — TELEPHONE ENCOUNTER
Reason for call:  Patient reporting a symptom    Symptom or request: Patient thinks she broke her toe (feels like she might have) patient wants to know how long she should hold out before having it checked out     Duration (how long have symptoms been present):   3 weeks     Have you been treated for this before? No    Additional comments: Wants to know if she needs to be seen or if this something that will resolve on its own.     Phone Number patient can be reached at:  Cell number on file:    Telephone Information:   Mobile [phone]       Best Time:  Anytime     Can we leave a detailed message on this number:  YES    Call taken on 12/5/2018 at 1:38 PM by Luz Elena Bernal

## 2018-12-05 NOTE — TELEPHONE ENCOUNTER
Kamilah Perry is a 53 year old female who calls with toe concerns.    NURSING ASSESSMENT:  Description:  Little toe on right foot was stubbed into a door 3 weeks ago. Has pain, taping to other toe. Only has pain when putting on shoes. Denies swelling, bruising on foot.   Onset/duration:  3 weeks ago   Precip. factors:  Stubbed toe in door   Associated symptoms:  Toe pain when putting on shoes   Improves/worsens symptoms:  Ongoing for 3 weeks, slightly improving   Pain scale (0-10)   Varies   LMP/preg/breast feeding:  No LMP recorded. Patient has had a hysterectomy.  Last exam/Treatment:  06/21/2018  Allergies:   Allergies   Allergen Reactions     Sulfa Drugs Hives     NURSING PLAN: Nursing advice to patient discussed could schedule OV and consider xray, discussed home care measures    RECOMMENDED DISPOSITION:  Home care advice - offered OV too   Will comply with recommendation: Yes will continue home care measures for 1 week and follow up in clinic if no improvement   If further questions/concerns or if symptoms do not improve, worsen or new symptoms develop, call your PCP or [facility] as soon as possible.    NOTES:  Disposition was determined by the first positive assessment question, therefore all previous assessment questions were negative    Guideline used:  Telephone Triage Protocols for Nurses, Fifth Edition, Zakiya Cohn  Fingers and Toe Problems  Nursing Judgment    Honey Tenorio, RN, BSN

## 2018-12-08 PROCEDURE — 82274 ASSAY TEST FOR BLOOD FECAL: CPT | Performed by: NURSE PRACTITIONER

## 2018-12-10 DIAGNOSIS — Z12.11 SPECIAL SCREENING FOR MALIGNANT NEOPLASMS, COLON: ICD-10-CM

## 2018-12-10 LAB — HEMOCCULT STL QL IA: NEGATIVE

## 2019-01-07 ENCOUNTER — TELEPHONE (OUTPATIENT)
Dept: FAMILY MEDICINE | Facility: CLINIC | Age: 54
End: 2019-01-07

## 2019-01-08 DIAGNOSIS — E03.9 MYXEDEMA HEART DISEASE: Primary | ICD-10-CM

## 2019-01-08 DIAGNOSIS — I51.9 MYXEDEMA HEART DISEASE: Primary | ICD-10-CM

## 2019-01-08 DIAGNOSIS — E05.90 PRETIBIAL MYXEDEMA: ICD-10-CM

## 2019-01-08 LAB
DEPRECATED CALCIDIOL+CALCIFEROL SERPL-MC: 25 UG/L (ref 20–75)
T4 FREE SERPL-MCNC: 1.29 NG/DL (ref 0.76–1.46)
TSH SERPL DL<=0.005 MIU/L-ACNC: 0.34 MU/L (ref 0.4–4)

## 2019-01-08 PROCEDURE — 84443 ASSAY THYROID STIM HORMONE: CPT

## 2019-01-08 PROCEDURE — 36415 COLL VENOUS BLD VENIPUNCTURE: CPT

## 2019-01-08 PROCEDURE — 82306 VITAMIN D 25 HYDROXY: CPT

## 2019-01-08 PROCEDURE — 84439 ASSAY OF FREE THYROXINE: CPT

## 2019-01-31 NOTE — TELEPHONE ENCOUNTER
Patient reports completing every April at [facility] [place].    Eloise Yi

## 2019-05-01 ENCOUNTER — TRANSFERRED RECORDS (OUTPATIENT)
Dept: HEALTH INFORMATION MANAGEMENT | Facility: CLINIC | Age: 54
End: 2019-05-01

## 2019-05-01 LAB — PAP SMEAR - HIM PATIENT REPORTED: NEGATIVE

## 2019-06-19 DIAGNOSIS — E55.9 VITAMIN D DEFICIENCY: ICD-10-CM

## 2019-06-19 DIAGNOSIS — E03.9 MYXEDEMA HEART DISEASE: Primary | ICD-10-CM

## 2019-06-19 DIAGNOSIS — I51.9 MYXEDEMA HEART DISEASE: Primary | ICD-10-CM

## 2019-06-19 LAB
T4 FREE SERPL-MCNC: 1.23 NG/DL (ref 0.76–1.46)
TSH SERPL DL<=0.005 MIU/L-ACNC: 0.49 MU/L (ref 0.4–4)

## 2019-06-19 PROCEDURE — 36415 COLL VENOUS BLD VENIPUNCTURE: CPT

## 2019-06-19 PROCEDURE — 82306 VITAMIN D 25 HYDROXY: CPT

## 2019-06-19 PROCEDURE — 84439 ASSAY OF FREE THYROXINE: CPT

## 2019-06-19 PROCEDURE — 84443 ASSAY THYROID STIM HORMONE: CPT

## 2019-06-20 LAB — DEPRECATED CALCIDIOL+CALCIFEROL SERPL-MC: 33 UG/L (ref 20–75)

## 2019-07-16 ENCOUNTER — TELEPHONE (OUTPATIENT)
Dept: FAMILY MEDICINE | Facility: CLINIC | Age: 54
End: 2019-07-16

## 2019-07-16 NOTE — TELEPHONE ENCOUNTER
Kamilah Perry is a 54 year old female who calls with ingrown hair.    NURSING ASSESSMENT:  Description:  I spoke with the pt who states she has an ingrown hair bikini line. pink bump, drains clear liquid. A little smaller then a pea. Area around it is not red. Can not see a hair  Onset/duration:  Couple of weeks    Allergies:   Allergies   Allergen Reactions     Sulfa Drugs Hives     NURSING PLAN: Huddle with provider, plan includes continue hot compress    RECOMMENDED DISPOSITION:  Home care advice - can try bacitracin   Will comply with recommendation: Yes  If further questions/concerns or if symptoms do not improve, worsen or new symptoms develop, call your PCP or [facility] as soon as possible.      Guideline used:   Telephone Triage Protocols for Nurses, Fifth Edition, Zakiya Tobin RN

## 2019-07-16 NOTE — TELEPHONE ENCOUNTER
Reason for Call:  Medication or medication refill:    Do you use a [facility]?  Name of the pharmacy and phone number for the current request:  [facility] - [phone]    Name of the medication requested: topical mediation to help with the ingrown hair    Other request: none    Can we leave a detailed message on this number? YES    Phone number patient can be reached at: Cell number on file:    Telephone Information:   Mobile [phone]       Best Time: anytime    Call taken on 7/16/2019 at 2:27 PM by Wood Gomez

## 2019-08-12 ENCOUNTER — OFFICE VISIT (OUTPATIENT)
Dept: FAMILY MEDICINE | Facility: CLINIC | Age: 54
End: 2019-08-12
Payer: COMMERCIAL

## 2019-08-12 VITALS
WEIGHT: 153 LBS | HEIGHT: 61 IN | BODY MASS INDEX: 28.89 KG/M2 | OXYGEN SATURATION: 96 % | HEART RATE: 86 BPM | DIASTOLIC BLOOD PRESSURE: 80 MMHG | SYSTOLIC BLOOD PRESSURE: 110 MMHG | TEMPERATURE: 98 F

## 2019-08-12 DIAGNOSIS — S76.311A HAMSTRING MUSCLE STRAIN, RIGHT, INITIAL ENCOUNTER: Primary | ICD-10-CM

## 2019-08-12 PROCEDURE — 99213 OFFICE O/P EST LOW 20 MIN: CPT | Performed by: NURSE PRACTITIONER

## 2019-08-12 ASSESSMENT — MIFFLIN-ST. JEOR: SCORE: 1242.99

## 2019-08-12 ASSESSMENT — PAIN SCALES - GENERAL: PAINLEVEL: MODERATE PAIN (4)

## 2019-08-12 NOTE — PATIENT INSTRUCTIONS
PRICE - Protection of the injured area, Relative rest, and decreasing swelling through Ice, Compression, and Elevation    Please let me know if your symptoms are not improving or becoming worse, as the next step will be a physical therapy referral.    KT Tape for compression    Maria C Munoz, CNP      Patient Education     ACE Wrap  Minor muscle or joint injuries are often treated with an elastic bandage. The bandage provides support and compression to the injured area. An elastic bandage is a stretchy, rolled bandage. Elastic bandages range in width from 2 to 6 inches. They can be used for a variety of injuries. The bandages are often called ACE bandages, after the most common brand name.  If used correctly, elastic bandages help control swelling and ease pain. An elastic bandage is also a good reminder not to overuse the injured area. However, elastic bandages do not provide a lot of support and will not prevent reinjury.  Home care    To apply an elastic bandage:    Check the skin before wrapping the injury. It should be clean, dry, and free of drainage.    Start wrapping below the injury and work your way toward the body. For an ankle sprain, start wrapping around the foot and work up toward the calf. This will help control swelling.    Overlap the edges of the bandage so it stays snuggly in place.    Wrap the bandage firmly, but not too tightly. A tight bandage can increase swelling on either end of the bandage. Make sure the bandage is wrinkle free.    Leave fingers and toes exposed.    Secure ends of the bandage (even self-sticking ones) with clips or tape.    Check often to be sure there is good circulation, especially in the fingers and toes. Loosen the bandage if there is local swelling, numbness, tingling, discomfort, coldness, or discoloration (skin pale or bluish in color).    Rewrap the bandage as needed during the day. Reroll the bandage as you unwind it.  Continue using the elastic bandage until  the pain and swelling are gone or as your healthcare provider advises.  If you have been told to ice the area, the ice can be secured in place with the elastic bandage. Wrap the ice pack with a thin towel to protect the skin. Don't put ice or an ice pack directly on the skin.  Ice the area for no more than 20 minutes at a time.    Follow-up care  Follow up with your healthcare provider, or as advised.  When to seek medical advice  Call your healthcare provider for any of the following:    Pain and swelling that doesn't get better or gets worse    Trouble moving injured area    Skin discoloration, numbness, or tingling that doesn t go away after bandage is removed  Date Last Reviewed: 5/1/2018 2000-2018 [employer]. [address] [place] [address]. All rights reserved. This information is not intended as a substitute for professional medical care. Always follow your healthcare professional's instructions.           Patient Education     Muscle Strain in the Extremities  A muscle strain is a stretching and tearing of muscle fibers. This causes pain, especially when you move that muscle. There may also be some swelling and bruising.  Home care    Keep the hurt area raised above heart level to reduce pain and swelling. This is especially important during the first 48 hours.    Apply an ice pack over the injured area for 15 to 20 minutes every 3 to 6 hours. You should do this for the first 24 to 48 hours. You can make an ice pack by filling a plastic bag that seals at the top with ice cubes and then wrapping it with a thin towel. Be careful not to injure your skin with the ice treatments. Ice should never be applied directly to skin. Continue the use of ice packs for relief of pain and swelling as needed. After 48 hours, apply heat (warm shower or warm bath) for 15 to 20 minutes several times a day, or alternate ice and heat.    You may use over-the-counter pain medicine to control pain, unless  another medicine was prescribed. If you have chronic liver or kidney disease or ever had a stomach ulcer or gastrointestinal bleeding, talk with your healthcare provider before using these medicines.    For leg strains: If crutches have been recommended, don t put full weight on the hurt leg until you can do so without pain. You can return to sports when you are able to hop and run on the injured leg without pain.  Follow-up care  Follow up with your healthcare provider, or as advised.  When to seek medical advice  Call your healthcare provider right away if any of these occur:    The toes of the injured leg become swollen, cold, blue, numb, or tingly    Pain or swelling increases  Date Last Reviewed: 5/1/2018 2000-2018 [employer]. [address] [place] [address], [place] [address]. All rights reserved. This information is not intended as a substitute for professional medical care. Always follow your healthcare professional's instructions.           Patient Education     Hamstring Curl (Strength)    This exercise is for an injured right knee. Switch sides if the injury is to your left knee.  1. Tie the ends of an elastic exercise band or tubing into a large, strong knot. Close the door on the elastic band, so the knot is on one side and the loop of the band is on the other. The elastic band should be close to the floor. You should be on the side of the door with the loop.  2. Sit in a chair facing the closed door. Slip the loop of the elastic exercise band around the heel of your right leg.  3. Slowly pull the elastic band backward along the floor with your heel. Stop when you can t pull it any farther. Hold in place for 10 seconds. Slowly return your leg to the starting position.  4. Repeat 10 times, or as instructed.     Safety tip: Take it slowly. If you do too much too soon, you can create new knee problems, or reinjure your knee.   Date Last Reviewed: 3/10/2016    9085-8658 [employer]  [address] [place] [address]. All rights reserved. This information is not intended as a substitute for professional medical care. Always follow your healthcare professional's instructions.           Patient Education     Hamstring Stretch (Flexibility)    1. Sit on the floor with your right leg straight in front of you. Bend your left leg so the sole of your foot rests against the inside of your right thigh.  2. Reach toward your ankle. Keep your knee, neck, and back straight. Feel the stretch in the back of your thigh.  3. Hold for 30 to 60 seconds. Repeat 2 times, or as instructed.  4. Switch legs and repeat.  5. Repeat this exercise 3 times per day, or as instructed.     Tip: Don t bounce while you re stretching.   Date Last Reviewed: 3/10/2016    4792-4914 [employer]. [address] [place] [address]. All rights reserved. This information is not intended as a substitute for professional medical care. Always follow your healthcare professional's instructions.           Patient Education     Hamstring Stretch (with Towel)    To start, lie on your back with your knees bent and feet flat on the floor. Don t press your neck or lower back to the floor. Breathe deeply. You should feel comfortable and relaxed in this position.  Here are the steps to the hamstring stretch:    Put a towel behind one knee or calf.    Use the towel to pull the leg toward your chest, keeping the leg straight or slightly bent.    Hold for 30 to 60 seconds. Then lower the leg.    Repeat 2 times.    Switch legs.   For your safety, check with your healthcare provider before starting an exercise program.   Date Last Reviewed: 11/1/2017 2000-2018 [employer]. [address] [place] [address]. All rights reserved. This information is not intended as a substitute for professional medical care. Always follow your healthcare professional's instructions.

## 2019-08-12 NOTE — PROGRESS NOTES
Subjective     Kamilah Perry is a 54 year old female who presents to clinic today for the following health issues:    HPI   Joint Pain    Onset: 1 week , pt heard pop sound     Description:   Location: right buttock and hamstring.   Character: Dull ache and it depends on what patient is doing.it could be sharp     Intensity: moderate    Progression of Symptoms: intermittent    Accompanying Signs & Symptoms:  Other symptoms: none    History:   Previous similar pain: no       Precipitating factors:   Trauma or overuse: YES- walking in kitchen, slipped on ice cube     Alleviating factors:  Improved by: ice- it did help     Therapies Tried and outcome: iced, ibuprofen, heat, stretching     Additional HPI:  - Ms. Perry is a 54-year old female who presents with right posterior thigh pain x 8 days  - Injured as stated in HPI above  - Pain is better than at time of injury, but is aggravated with bending and walking up a hill  - She works in retail, processing shipments  - No previous injury to right leg        Patient Active Problem List   Diagnosis     Xerosis cutis     KP (keratosis pilaris)     CD (contact dermatitis)     Focal hyperhidrosis     Erosio interdigitalis blastomycetica     Hypothyroid     CARDIOVASCULAR SCREENING; LDL GOAL LESS THAN 130     Acute maxillary sinusitis     Generalized anxiety disorder     Cervical pain     Disorder of ligament of ankle, right     Past Surgical History:   Procedure Laterality Date     D & C       GYN SURGERY  5/2011    partial hysterectomy       Social History     Tobacco Use     Smoking status: Never Smoker     Smokeless tobacco: Never Used   Substance Use Topics     Alcohol use: Yes     Comment: occasional     Family History   Problem Relation Age of Onset     Cancer Maternal Grandfather         skin     Hypertension Paternal Grandmother      Hypertension Paternal Grandfather      Congenital Anomalies Other      Diabetes No family hx of      C.A.D. No family hx of      Family  History Negative No family hx of      Asthma No family hx of      Cerebrovascular Disease No family hx of      Breast Cancer No family hx of      Cancer - colorectal No family hx of      Alcohol/Drug No family hx of      Alzheimer Disease No family hx of      Arthritis No family hx of      Eye Disorder No family hx of      Heart Disease No family hx of      Psychotic Disorder No family hx of      Hearing Loss No family hx of          Current Outpatient Medications   Medication Sig Dispense Refill     Cholecalciferol (VITAMIN D) 1000 UNITS capsule Take 1 capsule by mouth daily.       citalopram (CELEXA) 10 MG tablet Take 10 mg by mouth daily  3     DAILY MULTIVITAMIN OR 1 Tablet daily       estradiol (VIVELLE-DOT) 0.075 MG/24HR 0.30mg       levothyroxine (SYNTHROID, LEVOTHROID) 125 MCG tablet Take 125 mcg by mouth daily.       levothyroxine (SYNTHROID/LEVOTHROID) 112 MCG tablet Take 1 tablet by mouth daily  3     magnesium 200 MG TABS Take by mouth daily       Omega-3 Fatty Acids (FISH OIL) 1000 MG CPDR Take  by mouth daily.       PATADAY 0.2 % SOLN        Allergies   Allergen Reactions     Sulfa Drugs Hives     Recent Labs   Lab Test 06/19/19  1528 01/08/19  0958  11/30/15  0824   LDL  --   --   --  142*   HDL  --   --   --  70   TRIG  --   --   --  151*   ALT  --   --   --  33   CR  --   --   --  0.84   GFRESTIMATED  --   --   --  72   GFRESTBLACK  --   --   --  87   POTASSIUM  --   --   --  4.2   TSH 0.49 0.34*   < > 2.33    < > = values in this interval not displayed.      BP Readings from Last 3 Encounters:   08/12/19 110/80   01/22/18 133/82   01/05/18 102/70    Wt Readings from Last 3 Encounters:   08/12/19 69.4 kg (153 lb)   01/05/18 68.5 kg (151 lb)   09/08/17 68 kg (150 lb)                    Reviewed and updated as needed this visit by Provider  Tobacco  Allergies  Meds  Problems  Med Hx  Surg Hx  Fam Hx         Review of Systems   ROS COMP: Constitutional, HEENT, cardiovascular, pulmonary, gi and  "gu systems are negative, except as otherwise noted.      Objective    /80   Pulse 86   Temp 98  F (36.7  C) (Oral)   Ht 1.56 m (5' 1.42\")   Wt 69.4 kg (153 lb)   SpO2 96%   BMI 28.52 kg/m    Body mass index is 28.52 kg/m .  Physical Exam   GENERAL: healthy, alert and no distress  MS: normal muscle tone, normal range of motion, no edema, peripheral pulses normal, tenderness to palpation right posterior thigh and gait normal, no ataxia, area is not warm-to-touch, no ecchymosis   SKIN: no suspicious lesions or rashes  NEURO: Normal strength and tone, mentation intact and speech normal  PSYCH: mentation appears normal, affect normal/bright    Diagnostic Test Results:  Labs reviewed in Epic  none         Assessment & Plan     1. Hamstring muscle strain, right, initial encounter  Ms. Perry presents to clinic with chief complaint of right hamstring pain x 8 weeks.  Physical exam was normal, other than tenderness to the area with palpation.  We discussed conservative care measures such as PRICE, and to follow-up in 1-2 weeks if no improvement.  At that time, she would need PT.    - Physical Therapist she prefers is Franklin Arana [place], [place].  PRICE information and hamstring stretches included in AVS.    The patient was instructed to contact clinic for non-improvement as expected/discussed, worsening symptoms, and for questions regarding medications or treatment plan. All questions were answered to the best of my ability and the patient's satisfaction.         BMI:   Estimated body mass index is 28.52 kg/m  as calculated from the following:    Height as of this encounter: 1.56 m (5' 1.42\").    Weight as of this encounter: 69.4 kg (153 lb).           Patient Instructions     PRICE - Protection of the injured area, Relative rest, and decreasing swelling through Ice, Compression, and Elevation    Please let me know if your symptoms are not improving or becoming worse, as the next step will be a physical therapy " referral.    KT Tape for compression    Maria C Munoz, CNP      Patient Education     ACE Wrap  Minor muscle or joint injuries are often treated with an elastic bandage. The bandage provides support and compression to the injured area. An elastic bandage is a stretchy, rolled bandage. Elastic bandages range in width from 2 to 6 inches. They can be used for a variety of injuries. The bandages are often called ACE bandages, after the most common brand name.  If used correctly, elastic bandages help control swelling and ease pain. An elastic bandage is also a good reminder not to overuse the injured area. However, elastic bandages do not provide a lot of support and will not prevent reinjury.  Home care    To apply an elastic bandage:    Check the skin before wrapping the injury. It should be clean, dry, and free of drainage.    Start wrapping below the injury and work your way toward the body. For an ankle sprain, start wrapping around the foot and work up toward the calf. This will help control swelling.    Overlap the edges of the bandage so it stays snuggly in place.    Wrap the bandage firmly, but not too tightly. A tight bandage can increase swelling on either end of the bandage. Make sure the bandage is wrinkle free.    Leave fingers and toes exposed.    Secure ends of the bandage (even self-sticking ones) with clips or tape.    Check often to be sure there is good circulation, especially in the fingers and toes. Loosen the bandage if there is local swelling, numbness, tingling, discomfort, coldness, or discoloration (skin pale or bluish in color).    Rewrap the bandage as needed during the day. Reroll the bandage as you unwind it.  Continue using the elastic bandage until the pain and swelling are gone or as your healthcare provider advises.  If you have been told to ice the area, the ice can be secured in place with the elastic bandage. Wrap the ice pack with a thin towel to protect the skin. Don't put ice  or an ice pack directly on the skin.  Ice the area for no more than 20 minutes at a time.    Follow-up care  Follow up with your healthcare provider, or as advised.  When to seek medical advice  Call your healthcare provider for any of the following:    Pain and swelling that doesn't get better or gets worse    Trouble moving injured area    Skin discoloration, numbness, or tingling that doesn t go away after bandage is removed  Date Last Reviewed: 5/1/2018 2000-2018 [employer]. [address] [place], [place] [id number]. All rights reserved. This information is not intended as a substitute for professional medical care. Always follow your healthcare professional's instructions.           Patient Education     Muscle Strain in the Extremities  A muscle strain is a stretching and tearing of muscle fibers. This causes pain, especially when you move that muscle. There may also be some swelling and bruising.  Home care    Keep the hurt area raised above heart level to reduce pain and swelling. This is especially important during the first 48 hours.    Apply an ice pack over the injured area for 15 to 20 minutes every 3 to 6 hours. You should do this for the first 24 to 48 hours. You can make an ice pack by filling a plastic bag that seals at the top with ice cubes and then wrapping it with a thin towel. Be careful not to injure your skin with the ice treatments. Ice should never be applied directly to skin. Continue the use of ice packs for relief of pain and swelling as needed. After 48 hours, apply heat (warm shower or warm bath) for 15 to 20 minutes several times a day, or alternate ice and heat.    You may use over-the-counter pain medicine to control pain, unless another medicine was prescribed. If you have chronic liver or kidney disease or ever had a stomach ulcer or gastrointestinal bleeding, talk with your healthcare provider before using these medicines.    For leg strains: If crutches have  been recommended, don t put full weight on the hurt leg until you can do so without pain. You can return to sports when you are able to hop and run on the injured leg without pain.  Follow-up care  Follow up with your healthcare provider, or as advised.  When to seek medical advice  Call your healthcare provider right away if any of these occur:    The toes of the injured leg become swollen, cold, blue, numb, or tingly    Pain or swelling increases  Date Last Reviewed: 5/1/2018 2000-2018 [employer]. [address] [place] [address]. All rights reserved. This information is not intended as a substitute for professional medical care. Always follow your healthcare professional's instructions.           Patient Education     Hamstring Curl (Strength)    This exercise is for an injured right knee. Switch sides if the injury is to your left knee.  1. Tie the ends of an elastic exercise band or tubing into a large, strong knot. Close the door on the elastic band, so the knot is on one side and the loop of the band is on the other. The elastic band should be close to the floor. You should be on the side of the door with the loop.  2. Sit in a chair facing the closed door. Slip the loop of the elastic exercise band around the heel of your right leg.  3. Slowly pull the elastic band backward along the floor with your heel. Stop when you can t pull it any farther. Hold in place for 10 seconds. Slowly return your leg to the starting position.  4. Repeat 10 times, or as instructed.     Safety tip: Take it slowly. If you do too much too soon, you can create new knee problems, or reinjure your knee.   Date Last Reviewed: 3/10/2016    3798-9333 [employer]. [address] [place] [address]. All rights reserved. This information is not intended as a substitute for professional medical care. Always follow your healthcare professional's instructions.           Patient Education      Hamstring Stretch (Flexibility)    1. Sit on the floor with your right leg straight in front of you. Bend your left leg so the sole of your foot rests against the inside of your right thigh.  2. Reach toward your ankle. Keep your knee, neck, and back straight. Feel the stretch in the back of your thigh.  3. Hold for 30 to 60 seconds. Repeat 2 times, or as instructed.  4. Switch legs and repeat.  5. Repeat this exercise 3 times per day, or as instructed.     Tip: Don t bounce while you re stretching.   Date Last Reviewed: 3/10/2016    1271-6096 [employer]. [address] [place] [address]. All rights reserved. This information is not intended as a substitute for professional medical care. Always follow your healthcare professional's instructions.           Patient Education     Hamstring Stretch (with Towel)    To start, lie on your back with your knees bent and feet flat on the floor. Don t press your neck or lower back to the floor. Breathe deeply. You should feel comfortable and relaxed in this position.  Here are the steps to the hamstring stretch:    Put a towel behind one knee or calf.    Use the towel to pull the leg toward your chest, keeping the leg straight or slightly bent.    Hold for 30 to 60 seconds. Then lower the leg.    Repeat 2 times.    Switch legs.   For your safety, check with your healthcare provider before starting an exercise program.   Date Last Reviewed: 11/1/2017 2000-2018 [employer]. [address] [place] [address]. All rights reserved. This information is not intended as a substitute for professional medical care. Always follow your healthcare professional's instructions.               Return in about 1 week (around 8/19/2019).    Maria C Munoz, [facility]

## 2019-10-07 DIAGNOSIS — I51.9 MYXEDEMA HEART DISEASE: Primary | ICD-10-CM

## 2019-10-07 DIAGNOSIS — E03.9 MYXEDEMA HEART DISEASE: Primary | ICD-10-CM

## 2019-10-07 LAB
T4 FREE SERPL-MCNC: 1.24 NG/DL (ref 0.76–1.46)
TSH SERPL DL<=0.005 MIU/L-ACNC: 0.18 MU/L (ref 0.4–4)
TSH SERPL DL<=0.005 MIU/L-ACNC: 0.18 MU/L (ref 0.4–4)

## 2019-10-07 PROCEDURE — 84439 ASSAY OF FREE THYROXINE: CPT

## 2019-10-07 PROCEDURE — 84443 ASSAY THYROID STIM HORMONE: CPT

## 2019-10-07 PROCEDURE — 36415 COLL VENOUS BLD VENIPUNCTURE: CPT

## 2019-10-29 ENCOUNTER — TELEPHONE (OUTPATIENT)
Dept: FAMILY MEDICINE | Facility: CLINIC | Age: 54
End: 2019-10-29

## 2019-10-29 DIAGNOSIS — S76.311A HAMSTRING MUSCLE STRAIN, RIGHT, INITIAL ENCOUNTER: Primary | ICD-10-CM

## 2019-10-29 NOTE — TELEPHONE ENCOUNTER
Reason for Call:  Other     Detailed comments: Patient is still having issues with a pull hamstring on right leg and is wondering if she should have physical therapy. Please give patient a call back.    Phone Number Patient can be reached at: Cell number on file:    Telephone Information:   Mobile [phone]       Best Time: Anytime     Can we leave a detailed message on this number? YES    Call taken on 10/29/2019 at 8:14 AM by Reva Merchant

## 2019-10-29 NOTE — TELEPHONE ENCOUNTER
Hi,  Can you please call Kamilah and tell her I'm so sorry she is still having discomfort.    Can she verify the physical therapist she would like to see?  I believe his name is Franklin Enriquezell, but I want to make sure before putting in the referral.  Thank you.  Maria C Munoz, CNP

## 2019-10-29 NOTE — TELEPHONE ENCOUNTER
Patient seen Franklin Arana in [facility].     She is open to other locations as well.       Provider please place referral.

## 2019-10-30 NOTE — TELEPHONE ENCOUNTER
Can you please call aKmilah and let her know I placed a referral for physical therapy.  I put it under [facility] network so it will be covered under her insurance.  When they call to schedule, she can see if Sumit is covered.    Thank you.  Maria C Munoz, CNP

## 2019-11-05 ENCOUNTER — THERAPY VISIT (OUTPATIENT)
Dept: PHYSICAL THERAPY | Facility: CLINIC | Age: 54
End: 2019-11-05
Attending: NURSE PRACTITIONER
Payer: COMMERCIAL

## 2019-11-05 DIAGNOSIS — S76.311A HAMSTRING MUSCLE STRAIN, RIGHT, INITIAL ENCOUNTER: ICD-10-CM

## 2019-11-05 DIAGNOSIS — M25.551 HIP PAIN, RIGHT: ICD-10-CM

## 2019-11-05 PROCEDURE — 97110 THERAPEUTIC EXERCISES: CPT | Mod: GP | Performed by: PHYSICAL THERAPIST

## 2019-11-05 PROCEDURE — 97161 PT EVAL LOW COMPLEX 20 MIN: CPT | Mod: GP | Performed by: PHYSICAL THERAPIST

## 2019-11-05 PROCEDURE — 97140 MANUAL THERAPY 1/> REGIONS: CPT | Mod: GP | Performed by: PHYSICAL THERAPIST

## 2019-11-05 NOTE — PROGRESS NOTES
[facility] Initial Evaluation  Subjective:  Pt reports she fell on 8/2/19 and fell in an awkward posture, like doing the splits. States she felt a pop in the back of the leg. States she continues with pain in the proximal hamstrings from the buttock to the mid thigh. She now is getting some pain anterior in the hip, feels like there is a catch or kink in the hip. Cannot sit cross legged on the floor. Pt with referral dated 10/29/19 for PT evaluate and treat for hamstring pain.     The history is provided by the patient. No  was used.   Type of problem:  Right hip (R hamstring )   Condition occurred with:  A fall/slip. This is a new condition    Patient reports pain:  Posterior and anterior. Radiates to:  Thigh. Associated symptoms:  Catching and loss of motion/stiffness. Exacerbated by: Initial movement followng sitting, sitting on hard surface  and relieved by ice.    Where condition occurred: at home.  and reported as 3/10 on pain scale. General health as reported by patient is good. Pertinent medical history includes:  Thyroid problems.  Medical allergies: Sulfa drugs   Surgeries include:  None.  Current medications:  Thyroid medication and hormone replacement therapy.   Primary job tasks include:  Repetitive tasks, lifting/carrying, pushing/pulling and prolonged standing.  Pain is described as cramping and aching and is intermittent. Pain is worse in the P.M.. Since onset symptoms are gradually improving.  Previous treatment includes other (KT tape ). There was mild improvement following previous treatment.   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    Barriers include:  None as reported by patient.  Red flags:  None as reported by patient.                      Objective:  System                                           Hip Evaluation  HIP AROM:  AROM:   Left Hip:     Normal    Right Hip:                      Hip  Strength:  Hip Strength:   Left:    Normal  Right:                           Hip Special Testing:   Special tests hip not assessed: low L iliac crest low L iliac crest, (+) R standing flexion test, (+) L giletts test, low L ASIS, (+) L clavucilar jump test , sacral flexion test (+) for L on  R torsion, (+) shear test L side         Hip Palpation:      Right hip tenderness present at:  Piriformis             General     ROS    Assessment/Plan:    Patient is a 54 year old female with sacral complaints.    Patient has the following significant findings with corresponding treatment plan.                Diagnosis 1:  R hip/SI joint pain   Pain -  hot/cold therapy, manual therapy, self management, education, directional preference exercise and home program  Decreased joint mobility - manual therapy, therapeutic exercise and home program  Inflammation - cold therapy and self management/home program  Impaired muscle performance - neuro re-education and home program  Decreased function - therapeutic activities and home program    Therapy Evaluation Codes:   1) History comprised of:   Personal factors that impact the plan of care:      Time since onset of symptoms.    Comorbidity factors that impact the plan of care are:      None.     Medications impacting care: None.  2) Examination of Body Systems comprised of:   Body structures and functions that impact the plan of care:      Hip and Sacral illiac joint.   Activity limitations that impact the plan of care are:      Sitting and Squatting/kneeling.  3) Clinical presentation characteristics are:   Stable/Uncomplicated.  4) Decision-Making    Low complexity using standardized patient assessment instrument and/or measureable assessment of functional outcome.  Cumulative Therapy Evaluation is: Low complexity.    Previous and current functional limitations:  (See Goal Flow Sheet for this information)    Short term and Long term goals: (See Goal Flow Sheet for this information)      Communication ability:  Patient appears to be able to clearly communicate and understand verbal and written communication and follow directions correctly.  Treatment Explanation - The following has been discussed with the patient:   RX ordered/plan of care  Anticipated outcomes  Possible risks and side effects  This patient would benefit from PT intervention to resume normal activities.   Rehab potential is good.    Frequency:  1 X week, once daily  Duration:  for 8 weeks  Discharge Plan:  Achieve all LTG.  Independent in home treatment program.  Reach maximal therapeutic benefit.    Please refer to the daily flowsheet for treatment today, total treatment time and time spent performing 1:1 timed codes.

## 2019-11-05 NOTE — LETTER
[facility] ATHLETIC [facility] PHYSICAL THERAPY  [address]  [facility] [id number] [phone]    2019    Re: Kamilah Perry   :   1965  MRN:  [id number]   REFERRING PHYSICIAN:   Maria C Munoz    [facility] ATHLETIC [facility] PHYSICAL [facility]    Date of Initial Evaluation:  ***  Visits:  Rxs Used: 1  Reason for Referral:     Hamstring muscle strain, right, initial encounter  Hip pain, right    EVALUATION SUMMARY    [facility] Athletic [facility] Initial Evaluation  Subjective:  Pt reports she fell on 19 and fell in an awkward posture, like doing the splits. States she felt a pop in the back of the leg. States she continues with pain in the proximal hamstrings from the buttock to the mid thigh. She now is getting some pain anterior in the hip, feels like there is a catch or kink in the hip. Cannot sit cross legged on the floor. Pt with referral dated 10/29/19 for PT evaluate and treat for hamstring pain.     The history is provided by the patient. No  was used.   Type of problem:  Right hip (R hamstring )   Condition occurred with:  A fall/slip. This is a new condition    Patient reports pain:  Posterior and anterior. Radiates to:  Thigh. Associated symptoms:  Catching and loss of motion/stiffness. Exacerbated by: Initial movement followng sitting, sitting on hard surface  and relieved by ice.    Where condition occurred: at home.  and reported as 3/10 on pain scale. General health as reported by patient is good. Pertinent medical history includes:  Thyroid problems.  Medical allergies: Sulfa drugs   Surgeries include:  None.  Current medications:  Thyroid medication and hormone replacement therapy.   Primary job tasks include:  Repetitive tasks, lifting/carrying, pushing/pulling and prolonged standing.  Pain is described as cramping and aching and is intermittent. Pain is worse in the P.M.. Since onset symptoms are gradually  improving.  Previous treatment includes other (KT tape ). There was mild improvement following previous treatment.   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    Barriers include:  None as reported by patient.  Red flags:  None as reported by patient.                      Objective:  System                                           Hip Evaluation  HIP AROM:  AROM:   Left Hip:     Normal    Right Hip:                      Hip Strength:  Hip Strength:   Left:    Normal  Right:                           Hip Special Testing:   Special tests hip not assessed: low L iliac crest low L iliac crest, (+) R standing flexion test, (+) L giletts test, low L ASIS, (+) L clavucilar jump test , sacral flexion test (+) for L on  R torsion, (+) shear test L side         Hip Palpation:      Right hip tenderness present at:  Piriformis             General     ROS    Assessment/Plan:    Patient is a 54 year old female with sacral complaints.    Patient has the following significant findings with corresponding treatment plan.                Diagnosis 1:  R hip/SI joint pain   Pain -  hot/cold therapy, manual therapy, self management, education, directional preference exercise and home program  Decreased joint mobility - manual therapy, therapeutic exercise and home program  Inflammation - cold therapy and self management/home program  Impaired muscle performance - neuro re-education and home program  Decreased function - therapeutic activities and home program    Therapy Evaluation Codes:   1) History comprised of:   Personal factors that impact the plan of care:      Time since onset of symptoms.    Comorbidity factors that impact the plan of care are:      None.     Medications impacting care: None.  2) Examination of Body Systems comprised of:   Body structures and functions that impact the plan of care:      Hip and Sacral illiac joint.   Activity limitations that impact the plan of  care are:      Sitting and Squatting/kneeling.  3) Clinical presentation characteristics are:   Stable/Uncomplicated.  4) Decision-Making    Low complexity using standardized patient assessment instrument and/or measureable assessment of functional outcome.  Cumulative Therapy Evaluation is: Low complexity.    Previous and current functional limitations:  (See Goal Flow Sheet for this information)    Short term and Long term goals: (See Goal Flow Sheet for this information)     Communication ability:  Patient appears to be able to clearly communicate and understand verbal and written communication and follow directions correctly.  Treatment Explanation - The following has been discussed with the patient:   RX ordered/plan of care  Anticipated outcomes  Possible risks and side effects  This patient would benefit from PT intervention to resume normal activities.   Rehab potential is good.    Frequency:  1 X week, once daily  Duration:  for 8 weeks  Discharge Plan:  Achieve all LTG.  Independent in home treatment program.  Reach maximal therapeutic benefit.    Thank you for your referral.    INQUIRIES  Therapist:   [facility] [place] PH[facility]ICAL THERAPY  [address]  [facility] [address]  Phone: [phone]  Fax: [phone]

## 2019-11-07 DIAGNOSIS — M25.511 ACUTE PAIN OF RIGHT SHOULDER: Primary | ICD-10-CM

## 2019-11-11 ENCOUNTER — THERAPY VISIT (OUTPATIENT)
Dept: PHYSICAL THERAPY | Facility: CLINIC | Age: 54
End: 2019-11-11
Payer: COMMERCIAL

## 2019-11-11 DIAGNOSIS — M25.551 HIP PAIN, RIGHT: ICD-10-CM

## 2019-11-11 PROCEDURE — 97110 THERAPEUTIC EXERCISES: CPT | Mod: GP | Performed by: PHYSICAL THERAPIST

## 2019-11-11 PROCEDURE — 97140 MANUAL THERAPY 1/> REGIONS: CPT | Mod: GP | Performed by: PHYSICAL THERAPIST

## 2019-11-13 ENCOUNTER — THERAPY VISIT (OUTPATIENT)
Dept: PHYSICAL THERAPY | Facility: CLINIC | Age: 54
End: 2019-11-13
Payer: COMMERCIAL

## 2019-11-13 DIAGNOSIS — M25.511 ACUTE PAIN OF RIGHT SHOULDER: ICD-10-CM

## 2019-11-13 PROCEDURE — 97161 PT EVAL LOW COMPLEX 20 MIN: CPT | Mod: GP | Performed by: PHYSICAL THERAPIST

## 2019-11-13 PROCEDURE — 97110 THERAPEUTIC EXERCISES: CPT | Mod: GP | Performed by: PHYSICAL THERAPIST

## 2019-11-13 NOTE — PROGRESS NOTES
[place] [facility] Initial Evaluation  Subjective:  Pt presents with primary complaint of R shoulder pain. She has had the pain for the past 3 weeks, insidious in onset. States she had some blood drawn, moved between arms and started with shoulder pain on the R side. States the pain remains. States the shoulder feels stiff and achy, states has difficulty with lifting at work, feels stiff and achy. No pain at rest, primary pain with movement, lifting and twisting. Pt with referral dated 11/7/19 for PT evaluate and treat. She feels weakness in the arm as well.     The history is provided by the patient. No  was used.   Type of problem:  Right shoulder    This is a new condition    Patient reports pain:  In the joint, lateral, medial and posterior. Radiates to:  Upper arm. Associated symptoms:  Loss of strength and loss of motion/stiffness. Symptoms are exacerbated by carrying, lifting, certain positions, using arm at shoulder level, using arm behind back and using arm overhead and relieved by rest and NSAID's.    Where condition occurred: for unknown reasons (noticed following blood draw ).  and reported as 5/10 on pain scale. General health as reported by patient is good. Pertinent medical history includes:  Thyroid problems.  Medical allergies: See EPIC     Current medications:  Thyroid medication and hormone replacement therapy.   Primary job tasks include:  Prolonged standing, repetitive tasks, pushing/pulling and lifting/carrying.  Pain is described as aching and is intermittent. Pain is the same all the time (depends on activiy ). Since onset symptoms are gradually worsening.      Patient is [employer] . Restrictions include:  Working in normal job without restrictions.    Barriers include:  None as reported by patient.  Red flags:  None as reported by patient.                      Objective:  Standing Alignment:    Cervical/Thoracic:  Forward head  Shoulder/UE:  Rounded  shoulders                                       Shoulder Evaluation:  ROM:  AROM:    Flexion:  Left:  153    Right:  155    Abduction:  Left: 175   Right:  175      External Rotation:  Left:  90    Right:  90            Extension/Internal Rotation:  Left:  T3     Right:  T3 painful          Strength:    Flexion: Left:5/5   Pain:    Right: 5/5     Pain:     Abduction:  Left: 5/5  Pain:    Right: 4/5     Pain:    Internal Rotation:  Left:5/5     Pain:    Right: 5/5     Pain:  External Rotation:   Left:5/5     Pain:   Right:4/5     Pain:            Stability Testing:        Right shoulder stability negative testing:  Sulcus sign; Load and shift anterior and Load and shift posterior  Special Tests:  Special tests assessed shoulder: (-) spurlings test, full cx ROM     Right shoulder positive for the following special tests:Impingement  Right shoulder negative for the following special tests:Labral; Rotator cuff tear and Acrimioclavicular  Palpation:      Right shoulder tenderness present at: Infraspinatus and Bicipital Groove  Mobility Tests:    Glenohumeral anterior right:  Normal  Glenohumeral posterior right:  Normal  Glenohumeral inferior right:  Normal      Scapulothoracic right:  Normal    Scapulohumeral rhythm right:  Normal                                   General     ROS    Assessment/Plan:    Patient is a 54 year old female with right side shoulder complaints.    Patient has the following significant findings with corresponding treatment plan.                Diagnosis 1:  R shoulder pain   Pain -  hot/cold therapy, manual therapy, self management, education, directional preference exercise and home program  Decreased strength - therapeutic exercise, therapeutic activities and home program  Inflammation - cold therapy and self management/home program  Impaired muscle performance - neuro re-education and home program  Decreased function - therapeutic activities and home program  Impaired posture - neuro  re-education and home program    Therapy Evaluation Codes:   1) History comprised of:   Personal factors that impact the plan of care:      Profession.    Comorbidity factors that impact the plan of care are:      None.     Medications impacting care: None.  2) Examination of Body Systems comprised of:   Body structures and functions that impact the plan of care:      Shoulder.   Activity limitations that impact the plan of care are:      Lifting, Working and carrying, reaching.  3) Clinical presentation characteristics are:   Stable/Uncomplicated.  4) Decision-Making    Low complexity using standardized patient assessment instrument and/or measureable assessment of functional outcome.  Cumulative Therapy Evaluation is: Low complexity.    Previous and current functional limitations:  (See Goal Flow Sheet for this information)    Short term and Long term goals: (See Goal Flow Sheet for this information)     Communication ability:  Patient appears to be able to clearly communicate and understand verbal and written communication and follow directions correctly.  Treatment Explanation - The following has been discussed with the patient:   RX ordered/plan of care  Anticipated outcomes  Possible risks and side effects  This patient would benefit from PT intervention to resume normal activities.   Rehab potential is good.    Frequency:  1 X week, once daily  Duration:  for 8 weeks  Discharge Plan:  Achieve all LTG.  Independent in home treatment program.  Reach maximal therapeutic benefit.    Please refer to the daily flowsheet for treatment today, total treatment time and time spent performing 1:1 timed codes.

## 2019-11-13 NOTE — LETTER
[facility] ATHLETIC [facility] PHYSICAL [facility]  [address]  [facility] [id number] [phone]    2019    Re: Kamilah Perry   :   1965  MRN:  [id number]   REFERRING PHYSICIAN:   Maria C Munoz    [facility] ATHLETIC [facility]  Date of Initial Evaluation:  19  Visits:  Rxs Used: 1  Reason for Referral:  Acute pain of right shoulder    EVALUATION SUMMARY    [facility] Athletic [facility] Initial Evaluation  Subjective:  Pt presents with primary complaint of R shoulder pain. She has had the pain for the past 3 weeks, insidious in onset. States she had some blood drawn, moved between arms and started with shoulder pain on the R side. States the pain remains. States the shoulder feels stiff and achy, states has difficulty with lifting at work, feels stiff and achy. No pain at rest, primary pain with movement, lifting and twisting. Pt with referral dated 19 for PT evaluate and treat. She feels weakness in the arm as well.     The history is provided by the patient. No  was used.   Type of problem:  Right shoulder  This is a new condition    Patient reports pain:  In the joint, lateral, medial and posterior. Radiates to:  Upper arm. Associated symptoms:  Loss of strength and loss of motion/stiffness. Symptoms are exacerbated by carrying, lifting, certain positions, using arm at shoulder level, using arm behind back and using arm overhead and relieved by rest and NSAID's.    Where condition occurred: for unknown reasons (noticed following blood draw ).  and reported as 5/10 on pain scale. General health as reported by patient is good. Pertinent medical history includes:  Thyroid problems.  Medical allergies: See EPIC     Current medications:  Thyroid medication and hormone replacement therapy.   Primary job tasks include:  Prolonged standing, repetitive tasks, pushing/pulling and lifting/carrying.  Pain is  described as aching and is intermittent. Pain is the same all the time (depends on activiy ). Since onset symptoms are gradually worsening.      Patient is [employer] . Restrictions include:  Working in normal job without restrictions.    Barriers include:  None as reported by patient.  Red flags:  None as reported by patient.            Objective:  Standing Alignment:    Cervical/Thoracic:  Forward head  Shoulder/UE:  Rounded shoulders        Shoulder Evaluation:  ROM:  AROM:    Flexion:  Left:  153    Right:  155  Abduction:  Left: 175   Right:  175  External Rotation:  Left:  90    Right:  90  Extension/Internal Rotation:  Left:  T3     Right:  T3 painful      Strength:    Flexion: Left:5/5   Pain:    Right: 5/5     Pain:   Abduction:  Left: 5/5  Pain:    Right: 4/5     Pain:  Internal Rotation:  Left:5/5     Pain:    Right: 5/5     Pain:  External Rotation:   Left:5/5     Pain:   Right:4/5     Pain:      Stability Testing:    Right shoulder stability negative testing:  Sulcus sign; Load and shift anterior and Load and shift posterior    Special Tests:  Special tests assessed shoulder: (-) spurlings test, full cx ROM   Right shoulder positive for the following special tests:Impingement  Right shoulder negative for the following special tests:Labral; Rotator cuff tear and Acrimioclavicular    Palpation:    Right shoulder tenderness present at: Infraspinatus and Bicipital Groove    Mobility Tests:    Glenohumeral anterior right:  Normal  Glenohumeral posterior right:  Normal  Glenohumeral inferior right:  Normal    Scapulothoracic right:  Normal  Scapulohumeral rhythm right:  Normal         Assessment/Plan:    Patient is a 54 year old female with right side shoulder complaints.    Patient has the following significant findings with corresponding treatment plan.                Diagnosis 1:  R shoulder pain   Pain -  hot/cold therapy, manual therapy, self management, education, directional preference exercise and  home program  Decreased strength - therapeutic exercise, therapeutic activities and home program  Inflammation - cold therapy and self management/home program  Impaired muscle performance - neuro re-education and home program  Decreased function - therapeutic activities and home program  Impaired posture - neuro re-education and home program    Therapy Evaluation Codes:   1) History comprised of:   Personal factors that impact the plan of care:      Profession.    Comorbidity factors that impact the plan of care are:      None.     Medications impacting care: None.  2) Examination of Body Systems comprised of:   Body structures and functions that impact the plan of care:      Shoulder.   Activity limitations that impact the plan of care are:      Lifting, Working and carrying, reaching.  3) Clinical presentation characteristics are:   Stable/Uncomplicated.  4) Decision-Making    Low complexity using standardized patient assessment instrument and/or measureable assessment of functional outcome.  Cumulative Therapy Evaluation is: Low complexity.    Previous and current functional limitations:  (See Goal Flow Sheet for this information)    Short term and Long term goals: (See Goal Flow Sheet for this information)     Communication ability:  Patient appears to be able to clearly communicate and understand verbal and written communication and follow directions correctly.  Treatment Explanation - The following has been discussed with the patient:   RX ordered/plan of care  Anticipated outcomes  Possible risks and side effects  This patient would benefit from PT intervention to resume normal activities.   Rehab potential is good.    Frequency:  1 X week, once daily  Duration:  for 8 weeks  Discharge Plan:  Achieve all LTG.  Independent in home treatment program.  Reach maximal therapeutic benefit.        Thank you for your referral.    INQUIRIES  Therapist: Franklin Koroma   [facility] [place]  PHYSICAL THERAPY  [address]  [facility] [address]  Phone: [phone]  Fax: [phone]

## 2019-12-09 ENCOUNTER — THERAPY VISIT (OUTPATIENT)
Dept: PHYSICAL THERAPY | Facility: CLINIC | Age: 54
End: 2019-12-09
Payer: COMMERCIAL

## 2019-12-09 DIAGNOSIS — M25.551 HIP PAIN, RIGHT: ICD-10-CM

## 2019-12-09 PROCEDURE — 97110 THERAPEUTIC EXERCISES: CPT | Mod: GP | Performed by: PHYSICAL THERAPIST

## 2019-12-09 PROCEDURE — 97112 NEUROMUSCULAR REEDUCATION: CPT | Mod: GP | Performed by: PHYSICAL THERAPIST

## 2020-01-06 ENCOUNTER — NURSE TRIAGE (OUTPATIENT)
Dept: FAMILY MEDICINE | Facility: CLINIC | Age: 55
End: 2020-01-06

## 2020-01-06 NOTE — TELEPHONE ENCOUNTER
Reason for call:  Patient reporting a symptom    Symptom or request: possible pinched nerve left shoulder    Duration (how long have symptoms been present): a couple weeks    Have you been treated for this before? No    Additional comments: please call to discuss. Declined appt wanted to speak with triage    Phone Number patient can be reached at:  Home number on file [phone] (home)    Best Time:  any    Can we leave a detailed message on this number:  YES    Call taken on 1/6/2020 at 12:27 PM by Lili Wilhelm

## 2020-01-06 NOTE — TELEPHONE ENCOUNTER
Returned patient's call, upper back/neck pain, causing tingling down her L arm, into her fingers. Started after lifting heavy furniture at work one day.  - onset 2 weeks  - elbow down is the area of tingling that she is still experiencing  - started PT exercises found online, ice, ibuprofen  - pain in shoulder is not there now, around 4/10, takes ibuprofen and ices it which resolves pain completely  - also resting the arm  - wondering what a provider would recommend or when symptoms should improve on their own    DISPOSITION: see today in office  - she declines this at this time  - home care provided, encouraged her to keep doing what she is already doing at home, but switch to heat  - she may also try a massage, will call back if not improving to schedule OV      Additional Information    Negative: Shock suspected (e.g., cold/pale/clammy skin, too weak to stand, low BP, rapid pulse)    Negative: Similar pain previously and it was from 'heart attack'    Negative: Similar pain previously and it was from 'angina' and not relieved by nitroglycerin    Negative: Sounds like a life-threatening emergency to the triager    Negative: Chest pain    Negative: Followed an injury to shoulder    Negative: Difficulty breathing or unusual sweating (e.g., sweating without exertion)    Negative: Pain lasting > 5 minutes and pain also present in chest (Exception: pain is clearly made worse by movement)    Negative: Age > 40 and no obvious cause and pain even when not moving the arm (Exception: pain is clearly made worse by moving arm or bending neck)    Negative: Red area or streak and fever    Negative: Swollen joint and fever    Negative: Entire arm is swollen    Negative: Patient sounds very sick or weak to the triager    Negative: SEVERE pain (e.g., excruciating, unable to do any normal activities)    Negative: Shoulder pains with exertion (e.g., walking) and pain goes away on resting and not present now    Numbness (i.e., loss of  sensation) in hand or fingers    Protocols used: SHOULDER PAIN-A-OH    Asha Burnett, TENZINN, RN, PHN

## 2020-01-14 ENCOUNTER — TRANSFERRED RECORDS (OUTPATIENT)
Dept: HEALTH INFORMATION MANAGEMENT | Facility: CLINIC | Age: 55
End: 2020-01-14

## 2020-01-27 ENCOUNTER — TELEPHONE (OUTPATIENT)
Dept: FAMILY MEDICINE | Facility: CLINIC | Age: 55
End: 2020-01-27

## 2020-01-27 NOTE — TELEPHONE ENCOUNTER
Summary:    Patient is due/failing the following:   FIT and PHYSICAL    Action needed:   Patient needs office visit for Physical. and complete a FIT test    Type of outreach:    Phone, left message for patient to call back.     Questions for provider review:    None                                                                                                                                    Eloise Her CMA     Chart routed to Care Team .

## 2020-02-13 ENCOUNTER — TELEPHONE (OUTPATIENT)
Dept: OPHTHALMOLOGY | Facility: CLINIC | Age: 55
End: 2020-02-13

## 2020-02-13 NOTE — TELEPHONE ENCOUNTER
Returned phone call to pt. Discussed increasing use of artificial tears to every few hours to see if watery eye improves. If not, recommended pt schedule appointment with Dr Marmolejo for further evaluation.  Pt verbalized understanding. Ca Lisa RN

## 2020-02-13 NOTE — TELEPHONE ENCOUNTER
[facility] Call Center    Phone Message    May a detailed message be left on voicemail: yes     Reason for Call: Patient called stating that the eye that she had the plugs removed on lopez a lot. Patient would like to discuss this with someone. Please advise. Thank you.    Action Taken: Message routed to:  Adult Clinics: Eye p [id number]    Travel Screening: Not Applicable

## 2020-02-23 ENCOUNTER — HEALTH MAINTENANCE LETTER (OUTPATIENT)
Age: 55
End: 2020-02-23

## 2020-02-26 ENCOUNTER — OFFICE VISIT (OUTPATIENT)
Dept: ORTHOPEDICS | Facility: CLINIC | Age: 55
End: 2020-02-26
Payer: COMMERCIAL

## 2020-02-26 ENCOUNTER — ANCILLARY PROCEDURE (OUTPATIENT)
Dept: GENERAL RADIOLOGY | Facility: CLINIC | Age: 55
End: 2020-02-26
Attending: FAMILY MEDICINE
Payer: COMMERCIAL

## 2020-02-26 DIAGNOSIS — M25.551 RIGHT HIP PAIN: ICD-10-CM

## 2020-02-26 DIAGNOSIS — M25.551 RIGHT HIP PAIN: Primary | ICD-10-CM

## 2020-02-26 PROCEDURE — 99214 OFFICE O/P EST MOD 30 MIN: CPT | Performed by: FAMILY MEDICINE

## 2020-02-26 PROCEDURE — 73502 X-RAY EXAM HIP UNI 2-3 VIEWS: CPT | Mod: RT | Performed by: RADIOLOGY

## 2020-02-26 NOTE — LETTER
2/26/2020         RE: Kamilah Perry  [address] [facility] [address]        Dear Colleague,    Thank you for referring your patient, Kamilah Perry, to the [facility]. Please see a copy of my visit note below.          [facility]  2/26/2020    Kamilah Perry's chief complaint for this visit includes:  Chief Complaint   Patient presents with     Consult     right hip, groin, buttock pain, hamstring injury in August was doing PT but now has anterior hip groin pain,      PCP: Maricruz Moeller    Referring Provider:  No referring provider defined for this encounter.        Reason for visit:     What part of your body is injured / painful?  right hip, groin, buttock    What caused the injury /pain? Possible hamstring injury in August 2019    How long ago did your injury occur or pain begin? several months ago    What are your most bothersome symptoms? Pain    How would you characterize your symptom?  aching    What makes your symptoms better? Rest    What makes your symptoms worse? Walking     Have you been previously seen for this problem? No    Medical History:    Any recent changes to your medical history? No    Any new medication prescribed since last visit? No    Have you had surgery on this body part before? No    Social History:    Occupation: Retail     Handedness: Right    Review of Systems:    Do you have fever, chills, weight loss? No    Do you have any vision problems? No    Do you have any chest pain or edema? No    Do you have any shortness of breath or wheezing?  No    Do you have stomach problems? No    Do you have any numbness or focal weakness? No    Do you have diabetes? No    Do you have problems with bleeding or clotting? No    Do you have an rashes or other skin lesions? No       CHIEF COMPLAINT:  Consult (right hip, groin, buttock pain, hamstring injury in August was doing PT but now has anterior hip groin pain, )       HISTORY OF PRESENT ILLNESS  Ms.  Orlando is a pleasant 55 year old female who presents to clinic today with right hip pain.  Kamilah originally injured her hamstring in August of last year.  She stepped awkwardly and heard and felt a pop in the posterior aspect of her right hip.  She was seen at her primary care office and was referred to physical therapy.  She recovered fully after weeks to a month of physical therapy, however, she began to experience anterior hip pain throughout the course of physical therapy.  Currently she has pain that starts in her groin and radiates backwards, through her hip.  She feels that she has a hitch in her step.  Pain is mild, somewhat annoying, relatively constant but intermittently worse.  She denies any radicular symptoms down her leg.      Additional history: as documented    MEDICAL HISTORY  Patient Active Problem List   Diagnosis     Xerosis cutis     KP (keratosis pilaris)     CD (contact dermatitis)     Focal hyperhidrosis     Erosio interdigitalis blastomycetica     Hypothyroid     CARDIOVASCULAR SCREENING; LDL GOAL LESS THAN 130     Acute maxillary sinusitis     Generalized anxiety disorder     Cervical pain     Disorder of ligament of ankle, right     Hip pain, right     Acute pain of right shoulder       Current Outpatient Medications   Medication Sig Dispense Refill     Cholecalciferol (VITAMIN D) 1000 UNITS capsule Take 1 capsule by mouth daily.       citalopram (CELEXA) 10 MG tablet Take 10 mg by mouth daily  3     DAILY MULTIVITAMIN OR 1 Tablet daily       estradiol (VIVELLE-DOT) 0.075 MG/24HR 0.30mg       levothyroxine (SYNTHROID, LEVOTHROID) 125 MCG tablet Take 125 mcg by mouth daily.       levothyroxine (SYNTHROID/LEVOTHROID) 112 MCG tablet Take 1 tablet by mouth daily  3     magnesium 200 MG TABS Take by mouth daily       Omega-3 Fatty Acids (FISH OIL) 1000 MG CPDR Take  by mouth daily.       PATADAY 0.2 % SOLN          Allergies   Allergen Reactions     Sulfa Drugs Hives       Family History    Problem Relation Age of Onset     Cancer Maternal Grandfather         skin     Hypertension Paternal Grandmother      Hypertension Paternal Grandfather      Congenital Anomalies Other      Diabetes No family hx of      C.A.D. No family hx of      Family History Negative No family hx of      Asthma No family hx of      Cerebrovascular Disease No family hx of      Breast Cancer No family hx of      Cancer - colorectal No family hx of      Alcohol/Drug No family hx of      Alzheimer Disease No family hx of      Arthritis No family hx of      Eye Disorder No family hx of      Heart Disease No family hx of      Psychotic Disorder No family hx of      Hearing Loss No family hx of        Additional medical/Social/Surgical histories reviewed in [facility] and updated as appropriate.          PHYSICAL EXAM  General  - normal appearance, in no obvious distress  CV  - normal femoral pulse  Pulm  - normal respiratory pattern, non-labored  Musculoskeletal - right hip  - stance: normal gait without limp  - inspection: no swelling or effusion, normal bone and joint alignment, no obvious deformity  - palpation: no lateral or anterior hip tenderness  - ROM: mild pain with flexion and internal rotation, normal extension, external rotation, abduction, and adduction  - strength: 5/5 in all planes  - special tests:  (-) DEMETRIA  (-) FADIR    Neuro  - no sensory or motor deficit, grossly normal coordination, normal muscle tone  Skin  - no ecchymosis, erythema, warmth, or induration, no obvious rash  Psych  - interactive, appropriate, normal mood and affect             ASSESSMENT & PLAN  Ms. Perry is a 55 year old female who presents to clinic today with right hip pain.    I ordered and reviewed an x-ray of her hip and pelvis to rule out a hamstring avulsion fracture, this is an AP view of the pelvis and frog-leg view of the right hip which does show mild osteoarthritis, in the right hip.    It is difficult to determine whether her pain is  from a primary hip issue or is secondary to her lingering hamstring issue.  I do suspect that she may have an iliopsoas strain.    I am referring her to physical therapy, specifically for her iliopsoas.  If PT does not help I would likely order an MRI in the next 4 to 6 weeks.    Thank you for allowing me to participate in Kamilah's care.    Sagar Gordillo DO, [facility]  Primary Care Sports Medicine       This note was constructed using Dragon dictation software, please excuse any minor errors in spelling, grammar, or syntax.      Again, thank you for allowing me to participate in the care of your patient.        Sincerely,        Sagar Gordillo DO

## 2020-02-26 NOTE — PROGRESS NOTES
[facility]  2/26/2020    Kamilah Perry's chief complaint for this visit includes:  Chief Complaint   Patient presents with     Consult     right hip, groin, buttock pain, hamstring injury in August was doing PT but now has anterior hip groin pain,      PCP: Maricruz Moeller    Referring Provider:  No referring provider defined for this encounter.        Reason for visit:     What part of your body is injured / painful?  right hip, groin, buttock    What caused the injury /pain? Possible hamstring injury in August 2019    How long ago did your injury occur or pain begin? several months ago    What are your most bothersome symptoms? Pain    How would you characterize your symptom?  aching    What makes your symptoms better? Rest    What makes your symptoms worse? Walking     Have you been previously seen for this problem? No    Medical History:    Any recent changes to your medical history? No    Any new medication prescribed since last visit? No    Have you had surgery on this body part before? No    Social History:    Occupation: Retail     Handedness: Right    Review of Systems:    Do you have fever, chills, weight loss? No    Do you have any vision problems? No    Do you have any chest pain or edema? No    Do you have any shortness of breath or wheezing?  No    Do you have stomach problems? No    Do you have any numbness or focal weakness? No    Do you have diabetes? No    Do you have problems with bleeding or clotting? No    Do you have an rashes or other skin lesions? No       CHIEF COMPLAINT:  Consult (right hip, groin, buttock pain, hamstring injury in August was doing PT but now has anterior hip groin pain, )       HISTORY OF PRESENT ILLNESS  Ms. Perry is a pleasant 55 year old female who presents to clinic today with right hip pain.  Kamilah originally injured her hamstring in August of last year.  She stepped awkwardly and heard and felt a pop in the posterior aspect of her right hip.   She was seen at her primary care office and was referred to physical therapy.  She recovered fully after weeks to a month of physical therapy, however, she began to experience anterior hip pain throughout the course of physical therapy.  Currently she has pain that starts in her groin and radiates backwards, through her hip.  She feels that she has a hitch in her step.  Pain is mild, somewhat annoying, relatively constant but intermittently worse.  She denies any radicular symptoms down her leg.      Additional history: as documented    MEDICAL HISTORY  Patient Active Problem List   Diagnosis     Xerosis cutis     KP (keratosis pilaris)     CD (contact dermatitis)     Focal hyperhidrosis     Erosio interdigitalis blastomycetica     Hypothyroid     CARDIOVASCULAR SCREENING; LDL GOAL LESS THAN 130     Acute maxillary sinusitis     Generalized anxiety disorder     Cervical pain     Disorder of ligament of ankle, right     Hip pain, right     Acute pain of right shoulder       Current Outpatient Medications   Medication Sig Dispense Refill     Cholecalciferol (VITAMIN D) 1000 UNITS capsule Take 1 capsule by mouth daily.       citalopram (CELEXA) 10 MG tablet Take 10 mg by mouth daily  3     DAILY MULTIVITAMIN OR 1 Tablet daily       estradiol (VIVELLE-DOT) 0.075 MG/24HR 0.30mg       levothyroxine (SYNTHROID, LEVOTHROID) 125 MCG tablet Take 125 mcg by mouth daily.       levothyroxine (SYNTHROID/LEVOTHROID) 112 MCG tablet Take 1 tablet by mouth daily  3     magnesium 200 MG TABS Take by mouth daily       Omega-3 Fatty Acids (FISH OIL) 1000 MG CPDR Take  by mouth daily.       PATADAY 0.2 % SOLN          Allergies   Allergen Reactions     Sulfa Drugs Hives       Family History   Problem Relation Age of Onset     Cancer Maternal Grandfather         skin     Hypertension Paternal Grandmother      Hypertension Paternal Grandfather      Congenital Anomalies Other      Diabetes No family hx of      C.A.D. No family hx of       Family History Negative No family hx of      Asthma No family hx of      Cerebrovascular Disease No family hx of      Breast Cancer No family hx of      Cancer - colorectal No family hx of      Alcohol/Drug No family hx of      Alzheimer Disease No family hx of      Arthritis No family hx of      Eye Disorder No family hx of      Heart Disease No family hx of      Psychotic Disorder No family hx of      Hearing Loss No family hx of        Additional medical/Social/Surgical histories reviewed in [facility] and updated as appropriate.          PHYSICAL EXAM  General  - normal appearance, in no obvious distress  CV  - normal femoral pulse  Pulm  - normal respiratory pattern, non-labored  Musculoskeletal - right hip  - stance: normal gait without limp  - inspection: no swelling or effusion, normal bone and joint alignment, no obvious deformity  - palpation: no lateral or anterior hip tenderness  - ROM: mild pain with flexion and internal rotation, normal extension, external rotation, abduction, and adduction  - strength: 5/5 in all planes  - special tests:  (-) DEMETRIA  (-) FADIR    Neuro  - no sensory or motor deficit, grossly normal coordination, normal muscle tone  Skin  - no ecchymosis, erythema, warmth, or induration, no obvious rash  Psych  - interactive, appropriate, normal mood and affect             ASSESSMENT & PLAN  Ms. Perry is a 55 year old female who presents to clinic today with right hip pain.    I ordered and reviewed an x-ray of her hip and pelvis to rule out a hamstring avulsion fracture, this is an AP view of the pelvis and frog-leg view of the right hip which does show mild osteoarthritis, in the right hip.    It is difficult to determine whether her pain is from a primary hip issue or is secondary to her lingering hamstring issue.  I do suspect that she may have an iliopsoas strain.    I am referring her to physical therapy, specifically for her iliopsoas.  If PT does not help I would likely order an MRI  in the next 4 to 6 weeks.    Thank you for allowing me to participate in Kamilah's care.    Sagar Gordillo DO, [facility]  Primary Care Sports Medicine       This note was constructed using Dragon dictation software, please excuse any minor errors in spelling, grammar, or syntax.

## 2020-03-10 ENCOUNTER — THERAPY VISIT (OUTPATIENT)
Dept: PHYSICAL THERAPY | Facility: CLINIC | Age: 55
End: 2020-03-10
Attending: FAMILY MEDICINE
Payer: COMMERCIAL

## 2020-03-10 DIAGNOSIS — M25.551 RIGHT HIP PAIN: ICD-10-CM

## 2020-03-10 PROCEDURE — 97161 PT EVAL LOW COMPLEX 20 MIN: CPT | Mod: GP | Performed by: PHYSICAL THERAPIST

## 2020-03-10 PROCEDURE — 97110 THERAPEUTIC EXERCISES: CPT | Mod: GP | Performed by: PHYSICAL THERAPIST

## 2020-03-10 PROCEDURE — 97140 MANUAL THERAPY 1/> REGIONS: CPT | Mod: GP | Performed by: PHYSICAL THERAPIST

## 2020-03-10 ASSESSMENT — ACTIVITIES OF DAILY LIVING (ADL)
STANDING_FOR_15_MINUTES: NO DIFFICULTY AT ALL
WALKING_DOWN_STEEP_HILLS: SLIGHT DIFFICULTY
DEEP_SQUATTING: NO DIFFICULTY AT ALL
HOS_ADL_COUNT: 16
GETTING_INTO_AND_OUT_OF_AN_AVERAGE_CAR: SLIGHT DIFFICULTY
HOW_WOULD_YOU_RATE_YOUR_CURRENT_LEVEL_OF_FUNCTION_DURING_YOUR_USUAL_ACTIVITIES_OF_DAILY_LIVING_FROM_0_TO_100_WITH_100_BEING_YOUR_LEVEL_OF_FUNCTION_PRIOR_TO_YOUR_HIP_PROBLEM_AND_0_BEING_THE_INABILITY_TO_PERFORM_ANY_OF_YOUR_USUAL_DAILY_ACTIVITIES?: 95
LIGHT_TO_MODERATE_WORK: NO DIFFICULTY AT ALL
HEAVY_WORK: SLIGHT DIFFICULTY
WALKING_UP_STEEP_HILLS: SLIGHT DIFFICULTY
WALKING_APPROXIMATELY_10_MINUTES: SLIGHT DIFFICULTY
GOING_UP_1_FLIGHT_OF_STAIRS: NO DIFFICULTY AT ALL
WALKING_INITIALLY: SLIGHT DIFFICULTY
HOS_ADL_ITEM_SCORE_TOTAL: 55
GOING_DOWN_1_FLIGHT_OF_STAIRS: NO DIFFICULTY AT ALL
TWISTING/PIVOTING_ON_INVOLVED_LEG: SLIGHT DIFFICULTY
HOS_ADL_SCORE(%): 85.94
ROLLING_OVER_IN_BED: NO DIFFICULTY AT ALL
PUTTING_ON_SOCKS_AND_SHOES: NO DIFFICULTY AT ALL
HOS_ADL_HIGHEST_POTENTIAL_SCORE: 64
GETTING_INTO_AND_OUT_OF_A_BATHTUB: NO DIFFICULTY AT ALL
SITTING_FOR_15_MINUTES: NO DIFFICULTY AT ALL
WALKING_15_MINUTES_OR_GREATER: MODERATE DIFFICULTY
STEPPING_UP_AND_DOWN_CURBS: NO DIFFICULTY AT ALL

## 2020-03-10 NOTE — LETTER
[facility] PHYSICAL THERAPY  [address]  [facility] [address] [phone]    March 10, 2020    Re: Kamilah Perry   :   1965  MRN:  [id number]   REFERRING PHYSICIAN:   Sagar Gordillo    [facility] PHYSICAL THERAPY    Date of Initial Evaluation:  ***  Visits:  Rxs Used: 1  Reason for Referral:  Right hip pain    EVALUATION SUMMARY    [facility] Initial Evaluation  Subjective:  Pt reports she has pain in the hip on the R side in the anterior region, over the hip flexors. States she gets a catching and pulling sensation in the musculature on the R side. Pain has been present since 2019, initially felt as a HS pull. The pain moved anterior. She then was treated for SI joint issues and hip pain which alleviated, but continued with intermittent pain with increased activity. Walking is limited to one mile on the treadmill. Pt with referral for PT evaluate and treat dated 2020. Original injury was a fall on a wet floor with one leg out in front of her.     The history is provided by the patient. No  was used.   Therapist Generated HPI Evaluation         Type of problem:  Right hip.    This is a chronic condition.  Condition occurred with:  A fall/slip.  Where condition occurred: at home.  Patient reports pain:  Anterior.  Pain is described as aching (tightness and pulling ) and is intermittent.  Pain radiates to:  No radiation. Pain is worse in the P.M..  Since onset symptoms are unchanged.  Associated symptoms:  Loss of motion/stiffness. Symptoms are exacerbated by activity and walking  and relieved by other (prior PT exercises ).  Special tests included:  X-ray (Very mild arthritic changes ).  Previous treatment includes physical therapy. There was mild improvement following previous treatment.  Restrictions due to condition include:  Working in normal job without  restrictions.  Barriers include:  None as reported by patient.    Patient Health History         Pain score: Varies 0-4/10.  General health as reported by patient is good.  Pertinent medical history includes: thyroid problems.     Medical allergies: none.   Surgeries include:  None.    Current medications:  Thyroid medication and hormone replacement therapy.    Current occupation is sales retail.   Primary job tasks include:  Prolonged standing and repetitive tasks.                                    Objective:  System                                           Hip Evaluation  HIP AROM:  AROM:    Left Hip:     Normal (except extension lacking 13 deg to neutral )    Right Hip:   Normal (except extension lacking 13 degrees to neutral )                    Hip Strength:  Hip Strength:    Left:    Normal  Right:  Normal                          Hip Special Testing:   Special tests hip not assessed: SI joint with level landmarks and symmetrical motion.     Right hip positive for the following special tests:  ThomasRight hip negative for the following special tests:  Piriformis; Luis; Fadir/Labrum; SLR; Jarad's or Femoral Nerve    Hip Palpation:      Right hip tenderness present at:  hip flexors and Piriformis  Right hip tenderness not present at:  Ischial Tuberosity; Greater Trachanter; IT Band; PSIS; ASIS; Abductors; Iliac Crest; Gluteus Medius or Bursa             General     ROS    Assessment/Plan:    Patient is a 55 year old female with right side hip complaints.    Patient has the following significant findings with corresponding treatment plan.                Diagnosis 1:  R hip pain  Pain -  hot/cold therapy, manual therapy, self management, education, directional preference exercise and home program  Decreased ROM/flexibility - manual therapy, therapeutic exercise and home program  Inflammation - cold therapy and self management/home program  Impaired gait - gait training and home program  Impaired muscle  performance - neuro re-education and home program  Decreased function - therapeutic activities and home program    Therapy Evaluation Codes:   1) History comprised of:   Personal factors that impact the plan of care:      Time since onset of symptoms.    Comorbidity factors that impact the plan of care are:      None.     Medications impacting care: None.  2) Examination of Body Systems comprised of:   Body structures and functions that impact the plan of care:      Hip.   Activity limitations that impact the plan of care are:      Running, Squatting/kneeling and Walking.  3) Clinical presentation characteristics are:   Stable/Uncomplicated.  4) Decision-Making    Low complexity using standardized patient assessment instrument and/or measureable assessment of functional outcome.  Cumulative Therapy Evaluation is: Low complexity.    Previous and current functional limitations:  (See Goal Flow Sheet for this information)    Short term and Long term goals: (See Goal Flow Sheet for this information)     Communication ability:  Patient appears to be able to clearly communicate and understand verbal and written communication and follow directions correctly.  Treatment Explanation - The following has been discussed with the patient:   RX ordered/plan of care  Anticipated outcomes  Possible risks and side effects  This patient would benefit from PT intervention to resume normal activities.   Rehab potential is good.    Frequency:  1 X week, once daily  Duration:  for 8 weeks  Discharge Plan:  Achieve all LTG.  Independent in home treatment program.  Reach maximal therapeutic benefit.    Please refer to the daily flowsheet for treatment today, total treatment time and time spent performing 1:1 timed codes.         Thank you for your referral.    INQUIRIES  Therapist:   [facility]  [address]  [facility] [address]  Phone: [phone]  Fax: [phone]

## 2020-03-17 ENCOUNTER — THERAPY VISIT (OUTPATIENT)
Dept: PHYSICAL THERAPY | Facility: CLINIC | Age: 55
End: 2020-03-17
Attending: FAMILY MEDICINE
Payer: COMMERCIAL

## 2020-03-17 DIAGNOSIS — M25.551 HIP PAIN, RIGHT: Primary | ICD-10-CM

## 2020-03-17 PROCEDURE — 97140 MANUAL THERAPY 1/> REGIONS: CPT | Mod: GP | Performed by: PHYSICAL THERAPIST

## 2020-03-17 PROCEDURE — 97110 THERAPEUTIC EXERCISES: CPT | Mod: GP | Performed by: PHYSICAL THERAPIST

## 2020-04-09 ENCOUNTER — TELEPHONE (OUTPATIENT)
Dept: PHYSICAL THERAPY | Facility: CLINIC | Age: 55
End: 2020-04-09

## 2020-04-09 NOTE — TELEPHONE ENCOUNTER
Left Voicemail with  Hub # and [employer] [facility] #. Requested patient return call to let us know if she would like video visit with Amanda Hilligoss, Ihsan Jordan, or Zee Downing, or if she would like to wait until clinic reopens/ continue indepednently w/ home exercise program. Please send inTwoFishet message to Amanda Hilligoss with update if patient returns call.

## 2020-05-26 ENCOUNTER — TELEPHONE (OUTPATIENT)
Dept: FAMILY MEDICINE | Facility: CLINIC | Age: 55
End: 2020-05-26

## 2020-05-26 ENCOUNTER — NURSE TRIAGE (OUTPATIENT)
Dept: NURSING | Facility: CLINIC | Age: 55
End: 2020-05-26

## 2020-05-26 NOTE — TELEPHONE ENCOUNTER
Has been taking Bp at home and has been high.    At home 150/90    No HA or dizziness, blurred vision.    She would like to be seen in clinic.    Transferred to scheduling for an in clinic visit to discuss with provider.    Rachel Brown RN  [facility] Nurse Advisor      Additional Information    Negative: Sounds like a life-threatening emergency to the triager    Negative: Pregnant > 20 weeks or postpartum (< 6 weeks after delivery) and new hand or face swelling    Negative: Pregnant > 20 weeks and BP > 140/90    Negative: Systolic BP >= 160 OR Diastolic >= 100, and any cardiac or neurologic symptoms (e.g., chest pain, difficulty breathing, unsteady gait, blurred vision)    Negative: Patient sounds very sick or weak to the triager    BP Systolic BP >= 140 OR Diastolic >= 90 and postpartum (from 0 to 6 weeks after delivery)    Protocols used: HIGH BLOOD PRESSURE-A-OH

## 2020-05-26 NOTE — TELEPHONE ENCOUNTER
Reason for call:  Same Day Appointment   Requested Provider: Maricruz Moeller    PCP: [unfilled]    Reason for visit: blood pressure      Have you been treated for this in the past? Yes    Additional comments: Patient spoke with FNA. She is getting bp readings of 150/90 and is concerned. She is hoping to speak with Maricruz Moeller' nurse to clarify if she should come in for a face to face clinic appointment or if a telephone/video visit would be alright. She doesn't have a blood pressure cuff at home but is willing to get one if it means she could do a virtual visit. Please clarify.      Phone number to reach patient:  Home number on file [phone]    Best Time:  Asap    Can we leave a detailed message on this number?  YES    Travel screening: Not Applicable

## 2020-06-09 NOTE — PROGRESS NOTES
Discharge Note    Progress reporting period is from initial evaluation date (please see noted date below) to Mar 17, 2020.  Linked Episodes   Type: Episode: Status: Noted: Resolved: Last update: Updated by:   PHYSICAL THERAPY R hip pain 3- Active 3/10/2020  3/17/2020 10:23 AM Franklin Koroma, PT      Comments:       Kamilah failed to follow up and current status is unknown.  Please see information below for last relevant information on current status.  Patient seen for 2 visits.    SUBJECTIVE  Subjective changes noted by patient:  Pt reports she was sore following the last session for one day, then the pain has improved. It is no longer locking up on her, was able to walk 1.5 miles withot aggravation of the pain.   .  Current pain level is 0/10(Pain getting up from floor 2/10).     Previous pain level was  4/10.   Changes in function:  Yes (See Goal flowsheet attached for changes in current functional level)  Adverse reaction to treatment or activity: None    OBJECTIVE  Changes noted in objective findings: Improving, pain currently 0/10 with walking, has some pain getting up from the floor.      ASSESSMENT/PLAN  Diagnosis: R hip pain    Updated problem list and treatment plan:   Pain - HEP  Decreased function - HEP  STG/LTGs have been met or progress has been made towards goals:  Yes, please see goal flowsheet for most current information  Assessment of Progress: current status is unknown.    Last current status:     Self Management Plans:  HEP  I have re-evaluated this patient and find that the nature, scope, duration and intensity of the therapy is appropriate for the medical condition of the patient.  Kamilah continues to require the following intervention to meet STG and LTG's:  HEP.    Recommendations:  Discharge with current home program.  Patient to follow up with MD as needed.    Please refer to the daily flowsheet for treatment today, total treatment time and time spent performing 1:1 timed  codes.    Ihsan Jordan,PT, DPT, OCS

## 2020-07-01 ENCOUNTER — TELEPHONE (OUTPATIENT)
Dept: FAMILY MEDICINE | Facility: CLINIC | Age: 55
End: 2020-07-01

## 2020-07-01 NOTE — TELEPHONE ENCOUNTER
Patient returned call and stated that she gets her physicals done with her OB/GYN  She also states that she would call when she can  the fit test.

## 2020-07-01 NOTE — TELEPHONE ENCOUNTER
Summary:    Patient is due/failing the following:   COLONOSCOPY and PHYSICAL    Action needed:   Patient needs office visit for Physical . and schedule a colonoscopy or complete a FIT test     Type of outreach:    Phone, left message for patient to call back.     Questions for provider review:    None                                                                                                                                    Eloise Her CMA       Chart routed to Care Team .

## 2020-07-23 PROBLEM — M25.551 HIP PAIN, RIGHT: Status: RESOLVED | Noted: 2019-11-05 | Resolved: 2020-07-23

## 2020-07-23 PROBLEM — M25.511 ACUTE PAIN OF RIGHT SHOULDER: Status: RESOLVED | Noted: 2019-11-13 | Resolved: 2020-07-23

## 2020-07-23 NOTE — PROGRESS NOTES
Patient seen for one time evaluation and treatment.  Patient did not return for further treatment and current status is unknown.  Please see initial evaluation for further information.    Ihsan Jordan,PT, DPT, OCS

## 2020-08-25 ENCOUNTER — ANCILLARY PROCEDURE (OUTPATIENT)
Dept: MRI IMAGING | Facility: CLINIC | Age: 55
End: 2020-08-25
Attending: FAMILY MEDICINE
Payer: COMMERCIAL

## 2020-08-25 DIAGNOSIS — M25.551 RIGHT HIP PAIN: ICD-10-CM

## 2020-08-25 PROCEDURE — 73721 MRI JNT OF LWR EXTRE W/O DYE: CPT | Mod: RT | Performed by: RADIOLOGY

## 2020-08-27 ENCOUNTER — OFFICE VISIT (OUTPATIENT)
Dept: ORTHOPEDICS | Facility: CLINIC | Age: 55
End: 2020-08-27
Payer: COMMERCIAL

## 2020-08-27 DIAGNOSIS — M25.551 RIGHT HIP PAIN: Primary | ICD-10-CM

## 2020-08-27 PROCEDURE — 99207 ZZC DROP WITH A PROCEDURE: CPT | Performed by: FAMILY MEDICINE

## 2020-08-27 PROCEDURE — 20611 DRAIN/INJ JOINT/BURSA W/US: CPT | Mod: RT | Performed by: FAMILY MEDICINE

## 2020-08-27 RX ORDER — TRIAMCINOLONE ACETONIDE 40 MG/ML
40 INJECTION, SUSPENSION INTRA-ARTICULAR; INTRAMUSCULAR
Status: SHIPPED | OUTPATIENT
Start: 2020-08-27

## 2020-08-27 RX ADMIN — TRIAMCINOLONE ACETONIDE 40 MG: 40 INJECTION, SUSPENSION INTRA-ARTICULAR; INTRAMUSCULAR at 14:17

## 2020-08-27 NOTE — LETTER
8/27/2020         RE: Kamilah Perry  [address]  [facility] [address]        Dear Colleague,    Thank you for referring your patient, Kamilah Perry, to the [facility]. Please see a copy of my visit note below.    HISTORY OF PRESENT ILLNESS  Ms. Perry is a 55 year old year old female following up with right hip pain.    Kamilah is here to review her MRI.     PHYSICAL EXAM  General  - normal appearance, in no obvious distress  HEENT  - conjunctivae not injected, moist mucous membranes  CV  - normal femoral pulse  Pulm  - normal respiratory pattern, non-labored  Musculoskeletal - right hip  - stance: normal gait without limp   - inspection: no swelling or effusion, normal bone and joint alignment, no obvious deformity  - palpation: no lateral or anterior hip tenderness  - ROM: pain with flexion and internal rotation, normal extension, external rotation, abduction, and adduction  - strength: 5/5 in all planes  - special tests:  (-) DEMETRIA  (+) FADIR  Neuro  - no sensory or motor deficit, grossly normal coordination, normal muscle tone  Skin  - no ecchymosis, erythema, warmth, or induration, no obvious rash  Psych  - interactive, appropriate, normal mood and affect        ASSESSMENT & PLAN  Ms. Perry is a 55 year old year old female following up with right hip pain.    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    Through shared decision making we did decide to inject her hip joint on a diagnostic an therapeutic basis (see procedure note).    Kamilah is going to let me know how she is doing in a week.  If this injection does not help her whatsoever I would consider injecting her lateral hip.    It was a pleasure seeing Kamilah.    Aside to and separate from the procedure 20 minutes was spent in  the room, 15 of which was spent on counseling and coordination of care.        Sagar Gordillo DO, CAQSM            Scribed by Arianna Campos ATC for Dr. Gordillo on 8/27/20 at 8/27/20 PM, based on the providers statements to me.         [facility] FOLLOW-UP VISIT 8/27/2020    Kamilah Perry's chief complaint for this visit includes:  Chief Complaint   Patient presents with     RECHECK     right hip MRI      PCP: Maricruz Moeller      Interval History:     Follow up reason: right hip MRI       Medical History:    Any recent changes to your medical history? No    Any new medication prescribed since last visit? No    Review of Systems:    Do you have fever, chills, weight loss? No    Do you have any vision problems? No    Do you have any chest pain or edema? No    Do you have any shortness of breath or wheezing?  No    Do you have stomach problems? No    Do you have any urinary track issues? No    Do you have any numbness or focal weakness? No    Do you have diabetes? No    Do you have problems with bleeding or clotting? No    Do you have an rashes or other skin lesions? No  Large Joint Injection/Arthocentesis: R hip joint    Date/Time: 8/27/2020 2:17 PM  Performed by: Sagar Gordillo DO  Authorized by: Sagar Gordillo DO     Indications:  Pain  Needle Size:  22 G  Guidance: ultrasound    Approach:  Anterior  Location:  Hip      Site:  R hip joint  Medications:  40 mg triamcinolone 40 MG/ML  Outcome:  Tolerated well, no immediate complications  Procedure discussed: discussed risks, benefits, and alternatives    Consent Given by:  Patient  Timeout: timeout called immediately prior to procedure    Prep: patient was prepped and draped in usual sterile fashion       Intraarticular Hip Injection - Ultrasound Guided    The patient was informed of the risks and the benefits of the procedure and a written consent was signed.  The patient s right hip was prepped with chlorhexidine in sterile fashion.   40 mg of  triamcinolone suspension was drawn up into a 5 mL syringe with 4 mL of 1% lidocaine.  Injection was performed using sterile technique.  Under ultrasound guidance a 3.5-inch 22-gauge needle was used to enter the femoracetabular joint.  Anterior approach was used, needle placement was visualized and documented with ultrasound.  Ultrasound visualization was necessary due to decreased joint space in the setting of osteoarthritis.  Injection performed long axis to the probe.  Injection solution visualized within the joint space.  Images were permanently stored for the patient's record.  There were no complications. The patient tolerated the procedure well. There was negligible bleeding.   The patient was instructed to ice the hip upon leaving clinic and refrain from overuse over the next 3 days.   The patient was instructed to call or go to the emergency room with any unusual pain, swelling, redness, or if otherwise concerned.              Again, thank you for allowing me to participate in the care of your patient.        Sincerely,        Sagar Gordillo DO

## 2020-08-27 NOTE — PROGRESS NOTES
Scribed by Arianna Campos ATC for Dr. Gordillo on 8/27/20 at 8/27/20 PM, based on the providers statements to me.         [facility] FOLLOW-UP VISIT 8/27/2020    Kamilah Perry's chief complaint for this visit includes:  Chief Complaint   Patient presents with     RECHECK     right hip MRI      PCP: Maricruz Moeller      Interval History:     Follow up reason: right hip MRI       Medical History:    Any recent changes to your medical history? No    Any new medication prescribed since last visit? No    Review of Systems:    Do you have fever, chills, weight loss? No    Do you have any vision problems? No    Do you have any chest pain or edema? No    Do you have any shortness of breath or wheezing?  No    Do you have stomach problems? No    Do you have any urinary track issues? No    Do you have any numbness or focal weakness? No    Do you have diabetes? No    Do you have problems with bleeding or clotting? No    Do you have an rashes or other skin lesions? No  Large Joint Injection/Arthocentesis: R hip joint    Date/Time: 8/27/2020 2:17 PM  Performed by: Sagar Gordillo DO  Authorized by: Sagar Gordillo DO     Indications:  Pain  Needle Size:  22 G  Guidance: ultrasound    Approach:  Anterior  Location:  Hip      Site:  R hip joint  Medications:  40 mg triamcinolone 40 MG/ML  Outcome:  Tolerated well, no immediate complications  Procedure discussed: discussed risks, benefits, and alternatives    Consent Given by:  Patient  Timeout: timeout called immediately prior to procedure    Prep: patient was prepped and draped in usual sterile fashion       Intraarticular Hip Injection - Ultrasound Guided    The patient was informed of the risks and the benefits of the procedure and a written consent was signed.  The patient s right hip was prepped with chlorhexidine in sterile fashion.   40 mg of triamcinolone suspension was drawn up into a 5 mL syringe with 4 mL of 1% lidocaine.  Injection was performed using  sterile technique.  Under ultrasound guidance a 3.5-inch 22-gauge needle was used to enter the femoracetabular joint.  Anterior approach was used, needle placement was visualized and documented with ultrasound.  Ultrasound visualization was necessary due to decreased joint space in the setting of osteoarthritis.  Injection performed long axis to the probe.  Injection solution visualized within the joint space.  Images were permanently stored for the patient's record.  There were no complications. The patient tolerated the procedure well. There was negligible bleeding.   The patient was instructed to ice the hip upon leaving clinic and refrain from overuse over the next 3 days.   The patient was instructed to call or go to the emergency room with any unusual pain, swelling, redness, or if otherwise concerned.

## 2020-08-27 NOTE — PROGRESS NOTES
HISTORY OF PRESENT ILLNESS  Ms. Perry is a 55 year old year old female following up with right hip pain.    Kamilah is here to review her MRI.     PHYSICAL EXAM  General  - normal appearance, in no obvious distress  HEENT  - conjunctivae not injected, moist mucous membranes  CV  - normal femoral pulse  Pulm  - normal respiratory pattern, non-labored  Musculoskeletal - right hip  - stance: normal gait without limp   - inspection: no swelling or effusion, normal bone and joint alignment, no obvious deformity  - palpation: no lateral or anterior hip tenderness  - ROM: pain with flexion and internal rotation, normal extension, external rotation, abduction, and adduction  - strength: 5/5 in all planes  - special tests:  (-) DEMETRIA  (+) FADIR  Neuro  - no sensory or motor deficit, grossly normal coordination, normal muscle tone  Skin  - no ecchymosis, erythema, warmth, or induration, no obvious rash  Psych  - interactive, appropriate, normal mood and affect        ASSESSMENT & PLAN  Ms. Perry is a 55 year old year old female following up with right hip pain.    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    Through shared decision making we did decide to inject her hip joint on a diagnostic an therapeutic basis (see procedure note).    Kamilah is going to let me know how she is doing in a week.  If this injection does not help her whatsoever I would consider injecting her lateral hip.    It was a pleasure seeing Kamilah.    Aside to and separate from the procedure 20 minutes was spent in the room, 15 of which was spent on counseling and coordination of care.        Sagar Gordillo, DO, CAQSM

## 2020-11-05 ENCOUNTER — TELEPHONE (OUTPATIENT)
Dept: ORTHOPEDICS | Facility: CLINIC | Age: 55
End: 2020-11-05

## 2020-11-05 DIAGNOSIS — M25.551 RIGHT HIP PAIN: Primary | ICD-10-CM

## 2020-11-05 NOTE — TELEPHONE ENCOUNTER
[facility] Call [facility]    Phone Message    May a detailed message be left on voicemail: yes     Reason for Call: The patient is requesting a call to discuss the next steps/recommendations in the plan for her R hip.  The patient stated she is still in pain and having issues with her hip. Please advise.  Thank you.     Action Taken: Message routed to:  Adult Clinics: Sports Medicine p [phone]    Travel Screening: Not Applicable

## 2020-11-06 NOTE — TELEPHONE ENCOUNTER
Patient was contacted and a voicemail was left.     The patient was asked if the hip injection in August helped or not. If yes, we can repeat the injection, if it did not help we could consider an lateral, bursa injection.

## 2020-11-16 NOTE — TELEPHONE ENCOUNTER
The patient returned the callers call. She states the injection helped for her shot time. She still has the anterior pain, pain in the butocks and some side hip pain.  She is wondering what else can be done, specific PT- she is doing hip flexor exercisers with some relief. She does not want to keep having injection if they do not really help.

## 2020-11-25 ENCOUNTER — MYC MEDICAL ADVICE (OUTPATIENT)
Dept: ORTHOPEDICS | Facility: CLINIC | Age: 55
End: 2020-11-25

## 2020-12-04 ENCOUNTER — THERAPY VISIT (OUTPATIENT)
Dept: PHYSICAL THERAPY | Facility: CLINIC | Age: 55
End: 2020-12-04
Attending: FAMILY MEDICINE
Payer: COMMERCIAL

## 2020-12-04 DIAGNOSIS — M25.551 RIGHT HIP PAIN: ICD-10-CM

## 2020-12-04 DIAGNOSIS — M25.551 HIP PAIN, RIGHT: ICD-10-CM

## 2020-12-04 PROCEDURE — 97161 PT EVAL LOW COMPLEX 20 MIN: CPT | Mod: GP | Performed by: PHYSICAL THERAPIST

## 2020-12-04 PROCEDURE — 97110 THERAPEUTIC EXERCISES: CPT | Mod: GP | Performed by: PHYSICAL THERAPIST

## 2020-12-04 ASSESSMENT — ACTIVITIES OF DAILY LIVING (ADL)
WALKING_15_MINUTES_OR_GREATER: SLIGHT DIFFICULTY
TWISTING/PIVOTING_ON_INVOLVED_LEG: MODERATE DIFFICULTY
ROLLING_OVER_IN_BED: NO DIFFICULTY AT ALL
GETTING_INTO_AND_OUT_OF_AN_AVERAGE_CAR: SLIGHT DIFFICULTY
GOING_UP_1_FLIGHT_OF_STAIRS: MODERATE DIFFICULTY
GOING_DOWN_1_FLIGHT_OF_STAIRS: SLIGHT DIFFICULTY
WALKING_APPROXIMATELY_10_MINUTES: SLIGHT DIFFICULTY
HOW_WOULD_YOU_RATE_YOUR_CURRENT_LEVEL_OF_FUNCTION_DURING_YOUR_USUAL_ACTIVITIES_OF_DAILY_LIVING_FROM_0_TO_100_WITH_100_BEING_YOUR_LEVEL_OF_FUNCTION_PRIOR_TO_YOUR_HIP_PROBLEM_AND_0_BEING_THE_INABILITY_TO_PERFORM_ANY_OF_YOUR_USUAL_DAILY_ACTIVITIES?: 50
WALKING_DOWN_STEEP_HILLS: MODERATE DIFFICULTY
SITTING_FOR_15_MINUTES: SLIGHT DIFFICULTY
LIGHT_TO_MODERATE_WORK: MODERATE DIFFICULTY
RECREATIONAL_ACTIVITIES: MODERATE DIFFICULTY
WALKING_UP_STEEP_HILLS: SLIGHT DIFFICULTY
PUTTING_ON_SOCKS_AND_SHOES: NO DIFFICULTY AT ALL
GETTING_INTO_AND_OUT_OF_A_BATHTUB: SLIGHT DIFFICULTY
STEPPING_UP_AND_DOWN_CURBS: NO DIFFICULTY AT ALL
DEEP_SQUATTING: MODERATE DIFFICULTY
STANDING_FOR_15_MINUTES: SLIGHT DIFFICULTY
HEAVY_WORK: EXTREME DIFFICULTY

## 2020-12-04 NOTE — PROGRESS NOTES
"[facility] Initial Evaluation  Subjective:    Patient Health History  Kamilah Perry being seen for Exercises for hip pain.     Problem began: 8/3/2019.   Problem occurred: fall   Pain is reported as 5/10 on pain scale.  General health as reported by patient is good.  Pertinent medical history includes: high blood pressure, pain at night/rest and thyroid problems.   Red flags:  None as reported by patient.  Medical allergies: none.   Surgeries include:  None.    Current medications:  High blood pressure medication, hormone replacement therapy and thyroid medication.    Current occupation is Retail .   Primary job tasks include:  Computer work, lifting/carrying, pushing/pulling and other.   Other job/home tasks details: walking.                Therapist Generated HPI Evaluation  Problem details: Pt has hx of hamstring injury in Aug 2019 from falling/slipping into a split position. Had done some PT in early 2020 but pain continued. Had MRI in Aug 2020 which was mostly normal for the hip with exception moderate glute minimus \"tearing.\" Continues to experience pain in the R buttock, R lateral thigh and groin.  .         Type of problem:  Right hip.    This is a chronic condition.  Condition occurred with:  A fall/slip.  Where injured: friends home.  Patient reports pain:  Posterior and lateral.  Pain is described as aching and is constant.  Pain radiates to:  Thigh and lower leg. Pain is the same all the time.  Since onset symptoms are gradually worsening.  Associated symptoms:  Loss of motion/stiffness. Exacerbated by: squatting/lifting, walking too long, certain exercises.  and relieved by heat (stretching).  Special tests included:  MRI (see EPIC result).  Previous treatment includes physical therapy and other (hip joint injection under ultrasound). There was mild improvement following previous treatment.  Restrictions due to condition include:  Working in normal job without " restrictions.  Barriers include:  None as reported by patient.                        Objective:  Standing Alignment:        Lumbar:  Normal  Pelvic:  Normal  Hip:  Normal                       Lumbar/SI Evaluation    Lumbar Myotomes:    T12-L3 (Hip Flex):  Left: 5    Right: 4+  L2-4 (Quads):  Left:  5    Right:  5  L4 (Ankle DF):  Left:  5    Right:  5                                                        Hip Evaluation  Hip PROM:  Hip PROM:  Left Hip:    Normal  Right Hip:  Normal                          Hip Strength:  Hip Strength:    Left:    Normal  Right:  Normal (R hip flexion 4+/5)                          Hip Special Testing:      Right hip negative for the following special tests:  Luis or Fadir/Labrum    Hip Palpation:      Right hip tenderness present at:  IT Band; Piriformis and Abductors               [facility] Lumbar Evaluation    Posture:  Sitting: fair    Lordosis: WNL        Movement Loss:  Flexion (Flex): nil  Extension (EXT): min  Side Glide R (SG R): nil  Side Glide L (SG L): nil  Test Movements:        EIL: During: no effect  After: no effect  Mechanical Response: no effect  Repeat EIL: During: no effect  After: no effect  Mechanical Response: IncROM and no effect                                                 ROS    Assessment/Plan:    Patient is a 55 year old female with left side hip complaints.    Patient has the following significant findings with corresponding treatment plan.                Diagnosis 1:  R hip pain  Pain -  manual therapy, self management, education, directional preference exercise and home program  Decreased ROM/flexibility - manual therapy, therapeutic exercise, therapeutic activity and home program  Decreased joint mobility - manual therapy, therapeutic exercise, therapeutic activity and home program  Decreased strength - therapeutic exercise, therapeutic activities and home program  Inflammation - self management/home program  Impaired muscle performance - neuro  re-education and home program  Decreased function - therapeutic activities and home program    Cumulative Therapy Evaluation is: Low complexity.    Previous and current functional limitations:  (See Goal Flow Sheet for this information)    Short term and Long term goals: (See Goal Flow Sheet for this information)     Communication ability:  Patient appears to be able to clearly communicate and understand verbal and written communication and follow directions correctly.  Treatment Explanation - The following has been discussed with the patient:   RX ordered/plan of care  Anticipated outcomes  Possible risks and side effects  This patient would benefit from PT intervention to resume normal activities.   Rehab potential is excellent.    Frequency:  1 X week, once daily  Duration:  for 6 weeks  Discharge Plan:  Achieve all LTG.  Independent in home treatment program.  Reach maximal therapeutic benefit.    Please refer to the daily flowsheet for treatment today, total treatment time and time spent performing 1:1 timed codes.

## 2020-12-06 ENCOUNTER — HEALTH MAINTENANCE LETTER (OUTPATIENT)
Age: 55
End: 2020-12-06

## 2020-12-10 ENCOUNTER — VIRTUAL VISIT (OUTPATIENT)
Dept: ORTHOPEDICS | Facility: CLINIC | Age: 55
End: 2020-12-10
Payer: COMMERCIAL

## 2020-12-10 DIAGNOSIS — M25.551 HIP PAIN, RIGHT: Primary | ICD-10-CM

## 2020-12-10 PROCEDURE — 99207 PR NO CHARGE LOS: CPT | Performed by: FAMILY MEDICINE

## 2020-12-10 NOTE — PROGRESS NOTES
Left message for patient at 2:44 PM after no answer.  Happy to meet in the future to discuss her hip.    RUPALI

## 2020-12-10 NOTE — LETTER
12/10/2020         RE: Kaimlah Perry  [address]  [facility] [address]        Dear Colleague,    Thank you for referring your patient, Kamilah Perry, to the [facility] [place]. Please see a copy of my visit note below.    Left message for patient at 2:44 PM after no answer.  Happy to meet in the future to discuss her hip.    DS      Again, thank you for allowing me to participate in the care of your patient.        Sincerely,        Sagar Gordillo, DO

## 2020-12-10 NOTE — LETTER
12/10/2020         RE: Kamilah Perry  [address]  [place] [facility] [address]        Dear Colleague,    Thank you for referring your patient, Kamilah Perry, to the [facility] [place]. Please see a copy of my visit note below.    Left message for patient at 2:44 PM after no answer.  Happy to meet in the future to discuss her hip.    DS      Again, thank you for allowing me to participate in the care of your patient.        Sincerely,        Sagar Gordillo, DO

## 2020-12-11 ENCOUNTER — THERAPY VISIT (OUTPATIENT)
Dept: PHYSICAL THERAPY | Facility: CLINIC | Age: 55
End: 2020-12-11
Payer: COMMERCIAL

## 2020-12-11 DIAGNOSIS — M25.551 HIP PAIN, RIGHT: ICD-10-CM

## 2020-12-11 PROCEDURE — 97110 THERAPEUTIC EXERCISES: CPT | Mod: GP | Performed by: PHYSICAL THERAPIST

## 2020-12-11 PROCEDURE — 97530 THERAPEUTIC ACTIVITIES: CPT | Mod: GP | Performed by: PHYSICAL THERAPIST

## 2020-12-11 NOTE — PROGRESS NOTES
Subjective:  HPI  Physical Exam                    Objective:  System    Physical Exam    General     ROS    Assessment/Plan:    SUBJECTIVE  Subjective changes as noted by pt:  Performing the pressups and/or standing extension about 6x/day. Reports that the pain in the R hip is improved. Has some ache in the lower back.        Current pain level: 2/10     Changes in function:  Yes (See Goal flowsheet attached for changes in current functional level)     Adverse reaction to treatment or activity:  None    OBJECTIVE  Changes in objective findings:  Yes, See physical exam section and/or daily flowsheet for response to repeated movements.           ASSESSMENT  Kamilah continues to require intervention to meet STG and LTG's: PT  Patient is progressing as expected.  Response to therapy has shown an improvement in  pain level  Progress made towards STG/LTG?  Yes (See Goal flowsheet attached for updates on achievement of STG and LTG)    PLAN  Continue current treatment plan until patient demonstrates readiness to progress to higher level exercises.    PTA/ATC plan:  N/A    Please refer to the daily flowsheet for treatment today, total treatment time and time spent performing 1:1 timed codes.

## 2020-12-17 ENCOUNTER — TELEPHONE (OUTPATIENT)
Dept: ORTHOPEDICS | Facility: CLINIC | Age: 55
End: 2020-12-17

## 2020-12-17 NOTE — TELEPHONE ENCOUNTER
[facility] Call Center    Phone Message    May a detailed message be left on voicemail: yes     Reason for Call: The patient is requesting a call form either Dr. Gordillo or Arianna regarding a question she has about her him.  She will wait for a call back.     Action Taken: Message routed to:  Adult Clinics: Sports Medicine p [id number]    Travel Screening: Not Applicable

## 2020-12-18 NOTE — TELEPHONE ENCOUNTER
Patients call was returned, She states the PT is helping with most issues, but still having issue in her groin, ovary area, she is following up with her PCP as well. She is wondering if she has multiple issues going on.

## 2020-12-21 ENCOUNTER — TELEPHONE (OUTPATIENT)
Dept: ORTHOPEDICS | Facility: CLINIC | Age: 55
End: 2020-12-21

## 2020-12-21 NOTE — TELEPHONE ENCOUNTER
[facility] Call Center    Phone Message    May a detailed message be left on voicemail: no     Reason for Call: Other: Patient following up from Friday phone call, has additional questions for Dr Gordillo regarding her PT in correlation to her U/S from this past year. Please call patient at [phone]      Action Taken: Message routed to:  [facility]    Travel Screening: Not Applicable

## 2020-12-22 ENCOUNTER — THERAPY VISIT (OUTPATIENT)
Dept: PHYSICAL THERAPY | Facility: CLINIC | Age: 55
End: 2020-12-22
Payer: COMMERCIAL

## 2020-12-22 DIAGNOSIS — M25.551 HIP PAIN, RIGHT: ICD-10-CM

## 2020-12-22 PROCEDURE — 97140 MANUAL THERAPY 1/> REGIONS: CPT | Mod: GP | Performed by: PHYSICAL THERAPIST

## 2020-12-22 PROCEDURE — 97110 THERAPEUTIC EXERCISES: CPT | Mod: GP | Performed by: PHYSICAL THERAPIST

## 2020-12-22 NOTE — TELEPHONE ENCOUNTER
Patient was contacted today to discuss her symptoms of the groin pain, she states PT is helping with the outer hip pain. Dr. Gordillo is recommending to follow up with her PCP so we are not missing anything. She is also going to discuss her pain location at PT today, to specifically let Dr. Gordillo know the location.

## 2020-12-22 NOTE — PROGRESS NOTES
Subjective:  HPI  Physical Exam                    Objective:  System                                           Hip Evaluation      Hip Special Testing:       Right hip positive for the following special tests:  Luis                 General     ROS    Assessment/Plan:    SUBJECTIVE  Subjective changes as noted by pt:  Feels the exercises are helping with the low back pain and the thigh pain. Continues to have the R groin area pain which is really concerning her. Ended up having an ultrasound last week because she was concerned with a problem with her ovary. The US was negative.        Current pain level: 5/10     Changes in function:  Yes (See Goal flowsheet attached for changes in current functional level)     Adverse reaction to treatment or activity:  None    OBJECTIVE  Changes in objective findings:  Yes, See physical exam section and/or daily flowsheet for response to repeated movements.           ASSESSMENT  Kamilah continues to require intervention to meet STG and LTG's: PT  Patient is progressing as expected.  Response to therapy has shown an improvement in  pain level  Progress made towards STG/LTG?  Yes (See Goal flowsheet attached for updates on achievement of STG and LTG)    PLAN  Current treatment program is being advanced to more complex exercises.    PTA/ATC plan:  N/A    Please refer to the daily flowsheet for treatment today, total treatment time and time spent performing 1:1 timed codes.

## 2021-01-04 ENCOUNTER — THERAPY VISIT (OUTPATIENT)
Dept: PHYSICAL THERAPY | Facility: CLINIC | Age: 56
End: 2021-01-04
Payer: COMMERCIAL

## 2021-01-04 DIAGNOSIS — M25.551 HIP PAIN, RIGHT: ICD-10-CM

## 2021-01-04 PROCEDURE — 97530 THERAPEUTIC ACTIVITIES: CPT | Mod: GP | Performed by: PHYSICAL THERAPIST

## 2021-01-04 PROCEDURE — 97140 MANUAL THERAPY 1/> REGIONS: CPT | Mod: GP | Performed by: PHYSICAL THERAPIST

## 2021-01-04 PROCEDURE — 97110 THERAPEUTIC EXERCISES: CPT | Mod: GP | Performed by: PHYSICAL THERAPIST

## 2021-01-04 NOTE — PROGRESS NOTES
Subjective:  HPI  Physical Exam                    Objective:  System    Physical Exam    General     ROS    Assessment/Plan:    SUBJECTIVE  Subjective changes as noted by pt:  Doing well, seeing improvement. Had some R low back pain this morning and pressups resolved it. Still has some of the R groin pain.        Current pain level: 2/10     Changes in function:  Yes (See Goal flowsheet attached for changes in current functional level)     Adverse reaction to treatment or activity:  None    OBJECTIVE  Changes in objective findings:  Yes, WNL lumbar AROM with ERP on L SGIS.      R hip PROM WNL.          ASSESSMENT  Kamilah continues to require intervention to meet STG and LTG's: PT  Patient is progressing as expected.  Response to therapy has shown an improvement in  pain level  Progress made towards STG/LTG?  Yes (See Goal flowsheet attached for updates on achievement of STG and LTG)    PLAN  Current treatment program is being advanced to more complex exercises.    PTA/ATC plan:  N/A    Please refer to the daily flowsheet for treatment today, total treatment time and time spent performing 1:1 timed codes.

## 2021-01-12 ENCOUNTER — THERAPY VISIT (OUTPATIENT)
Dept: PHYSICAL THERAPY | Facility: CLINIC | Age: 56
End: 2021-01-12
Payer: COMMERCIAL

## 2021-01-12 DIAGNOSIS — M25.551 HIP PAIN, RIGHT: ICD-10-CM

## 2021-01-12 PROCEDURE — 97530 THERAPEUTIC ACTIVITIES: CPT | Mod: GP | Performed by: PHYSICAL THERAPIST

## 2021-01-12 PROCEDURE — 97110 THERAPEUTIC EXERCISES: CPT | Mod: GP | Performed by: PHYSICAL THERAPIST

## 2021-02-11 ENCOUNTER — THERAPY VISIT (OUTPATIENT)
Dept: PHYSICAL THERAPY | Facility: CLINIC | Age: 56
End: 2021-02-11
Payer: COMMERCIAL

## 2021-02-11 DIAGNOSIS — M25.551 HIP PAIN, RIGHT: ICD-10-CM

## 2021-02-11 PROCEDURE — 97530 THERAPEUTIC ACTIVITIES: CPT | Mod: 95 | Performed by: PHYSICAL THERAPIST

## 2021-02-11 PROCEDURE — 97110 THERAPEUTIC EXERCISES: CPT | Mod: 95 | Performed by: PHYSICAL THERAPIST

## 2021-02-11 NOTE — PROGRESS NOTES
"Subjective:  HPI  Physical Exam                    Objective:  System    Physical Exam      [place] Lumbar Evaluation      Movement Loss:  Flexion (Flex): nil  Extension (EXT): nil  Side Glide R (SG R): nil  Side [place] L (SG L): nil                                               ROS    Assessment/Plan:    PROGRESS  REPORT    Progress reporting period is from 12/4/20 to 2/11/21.       SUBJECTIVE  Subjective changes noted by patient:  Kamilah reports that things are \"going really good.\"  The whole side and back of hip are gone. Some back symptoms which are helped by pressups. Overall feels about 85% improved. Feels like the improvement has leveled off.          Current pain level is 0/10  .     Previous pain level was  5/10  .   Changes in function:  Yes (See Goal flowsheet attached for changes in current functional level)  Adverse reaction to treatment or activity: None    OBJECTIVE  Changes noted in objective findings:  Yes, see physical exam section        ASSESSMENT/PLAN  Updated problem list and treatment plan: Diagnosis 1:   R hip pain    Pain -  manual therapy, self management, education, directional preference exercise and home program  Decreased ROM/flexibility - manual therapy, therapeutic exercise, therapeutic activity and home program  Decreased joint mobility - manual therapy, therapeutic exercise, therapeutic activity and home program  Decreased strength - therapeutic exercise, therapeutic activities and home program  Inflammation - self management/home program  Decreased function - therapeutic activities and home program  STG/LTGs have been met or progress has been made towards goals:  Yes (See Goal flow sheet completed today.)  Assessment of Progress: The patient's condition is improving.  Self Management Plans:  Patient has been instructed in a home treatment program.  Patient  has been instructed in self management of symptoms.  I have re-evaluated this patient and find that the nature, scope, " duration and intensity of the therapy is appropriate for the medical condition of the patient.  Kamilah continues to require the following intervention to meet STG and LTG's:  PT    Recommendations:  This patient would benefit from continued therapy.     Frequency:  1 X a month, once daily  Duration:  for 1 months      Please refer to the daily flowsheet for treatment today, total treatment time and time spent performing 1:1 timed codes.

## 2021-04-11 ENCOUNTER — HEALTH MAINTENANCE LETTER (OUTPATIENT)
Age: 56
End: 2021-04-11

## 2021-04-12 PROBLEM — M25.551 HIP PAIN, RIGHT: Status: RESOLVED | Noted: 2020-12-04 | Resolved: 2021-04-12

## 2021-04-12 NOTE — PROGRESS NOTES
Patient did not return for further treatment and no additional progress was noted.  Please refer to the progress note and goal flowsheet completed on 02/11/21 for discharge information.

## 2021-07-29 NOTE — PROGRESS NOTES
Subjective:  HPI  Physical Exam                    Objective:  System    Physical Exam      [place] Lumbar Evaluation      Movement Loss:  Flexion (Flex): nil  Extension (EXT): min  Side Glide R (SG R): min and pain  Side [place] L (SG L): min and pain  Test Movements:      RADHA: During: no effect  After: no effect  Mechanical Response: no effect  Repeat RADHA: During: no effect  After: no effect  Mechanical Response: no effect  EIL: During: no effect  After: no effect  Mechanical Response: no effect  Repeat EIL: During: no effect  After: no effect  Mechanical Response: no effect        Conclusion: other (sx peripheralized into R buttock after repeaetd RADHA with clinican overpressure)  Principle of Treatment:  Posture Correction: lumbar support    Extension: EIL with lock/sag 10-15 reps every 2-3 hrs                                           ROS    Assessment/Plan:    PROGRESS  REPORT    Progress reporting period is from 12/4/20 to 8/3/21.       SUBJECTIVE  Subjective changes noted by patient:  Pt returns to PT after about six months. Reports that her hip/R low back were doing well. But about three weeks ago started to notice increased R low back pain. Had been performing the pressups 1x/day, but stopped to make sure it wasn't aggravating. Symptoms worst now rising from sitting position at desk or in car.   .       Current pain level is 2/10  .     Previous pain level was  5/10    Changes in function:  Yes (See Goal flowsheet attached for changes in current functional level)  Adverse reaction to treatment or activity: None    OBJECTIVE  Changes noted in objective findings:  Yes, See physical exam section and/or daily flowsheet for response to repeated movements.           ASSESSMENT/PLAN  Updated problem list and treatment plan: Diagnosis 1:  R low back pain  Pain -  self management, education, directional preference exercise and home program  Decreased ROM/flexibility - manual therapy, therapeutic exercise, therapeutic  activity and home program  Decreased joint mobility - manual therapy, therapeutic exercise, therapeutic activity and home program  Decreased strength - therapeutic exercise, therapeutic activities and home program  STG/LTGs have been met or progress has been made towards goals:  Yes (See Goal flow sheet completed today.)  Assessment of Progress: The patient's condition has exacerbated.  Self Management Plans:  Patient has been instructed in a home treatment program.  Patient  has been instructed in self management of symptoms.  I have re-evaluated this patient and find that the nature, scope, duration and intensity of the therapy is appropriate for the medical condition of the patient.  Kamilah continues to require the following intervention to meet STG and LTG's:  PT    Recommendations:  This patient would benefit from continued therapy.     Frequency:  2 X week, once daily  Duration:  for 3 weeks        Please refer to the daily flowsheet for treatment today, total treatment time and time spent performing 1:1 timed codes.

## 2021-08-02 ENCOUNTER — THERAPY VISIT (OUTPATIENT)
Dept: PHYSICAL THERAPY | Facility: CLINIC | Age: 56
End: 2021-08-02
Payer: COMMERCIAL

## 2021-08-02 DIAGNOSIS — M25.551 RIGHT HIP PAIN: Primary | ICD-10-CM

## 2021-08-02 DIAGNOSIS — M54.50 ACUTE RIGHT-SIDED LOW BACK PAIN: ICD-10-CM

## 2021-08-02 PROCEDURE — 97110 THERAPEUTIC EXERCISES: CPT | Mod: GP | Performed by: PHYSICAL THERAPIST

## 2021-08-02 PROCEDURE — 97164 PT RE-EVAL EST PLAN CARE: CPT | Mod: GP | Performed by: PHYSICAL THERAPIST

## 2021-09-13 PROBLEM — M54.50 ACUTE RIGHT-SIDED LOW BACK PAIN: Status: RESOLVED | Noted: 2021-08-02 | Resolved: 2021-09-13

## 2021-09-13 NOTE — PROGRESS NOTES
Patient did not return for further treatment and no additional progress was noted.  Please refer to the progress note and goal flowsheet completed on 08/02/21 for discharge information.

## 2021-09-26 ENCOUNTER — HEALTH MAINTENANCE LETTER (OUTPATIENT)
Age: 56
End: 2021-09-26

## 2022-01-13 ENCOUNTER — TELEPHONE (OUTPATIENT)
Dept: ORTHOPEDICS | Facility: CLINIC | Age: 57
End: 2022-01-13

## 2022-01-13 DIAGNOSIS — M25.551 HIP PAIN, RIGHT: Primary | ICD-10-CM

## 2022-01-13 NOTE — TELEPHONE ENCOUNTER
[facility] Call Center    Phone Message    May a detailed message be left on voicemail: yes     Reason for Call: Other: pt calling and needs her PT referral renewed, she has appt today. please advise      Action Taken: Message routed to:  Adult Clinics: Sports Medicine p [phone]    Travel Screening: Not Applicable

## 2022-03-17 ENCOUNTER — OFFICE VISIT (OUTPATIENT)
Dept: ORTHOPEDICS | Facility: CLINIC | Age: 57
End: 2022-03-17
Payer: COMMERCIAL

## 2022-03-17 DIAGNOSIS — M25.551 HIP PAIN, RIGHT: Primary | ICD-10-CM

## 2022-03-17 PROCEDURE — 99213 OFFICE O/P EST LOW 20 MIN: CPT | Performed by: FAMILY MEDICINE

## 2022-03-17 NOTE — LETTER
3/17/2022         RE: Kamilah Perry  [address]  [facility] [address]        Dear Colleague,    Thank you for referring your patient, Kamilah Perry, to the [facility] [place]. Please see a copy of my visit note below.    HISTORY OF PRESENT ILLNESS  Ms. Perry is a pleasant 57 year old female following up with right hip pain.  Kamilah does have a history of right hip pain, specifically degenerative changes of the labrum and insertional tendinopathy of the gluteus musculature at the trochanter.  She last saw me for her right hip in August 2020, at that visit I injected her hip with corticosteroid.  Her hip is still bothering her, she has pain at the lateral aspect of her hip, as well as when she brings her hip across her body.  She points to the posterior aspect of her hip, specifically at the PSIS.     PHYSICAL EXAM  General  - normal appearance, in no obvious distress  Musculoskeletal - right hip  - stance: normal gait without limp  - inspection: no swelling or effusion, normal bone and joint alignment, no obvious deformity  - palpation: tender at some tissue lateral to PSIS  - ROM: normal flexion, extension, IR, ER, abduction, adduction, not painful, no crepitus  - strength: 5/5 in all planes  - special tests:  (-) DEMETRIA  (-) FADIR  Neuro  - no sensory or motor deficit, grossly normal coordination, normal muscle tone          ASSESSMENT & PLAN  Ms. Perry is a 57 year old female following up with right hip pain.    I again reviewed her MR in the room with her, this was done in October 2020 and does show degenerative changes at the labrum as well as insertional tendinopathy of the gluteus medius and minimus with a partial tear of the gluteus minimus.    Kamilah's pain is suggestive of an injury at the proximal portion of the gluteus, specifically at the gluteus la origin at the SI joint region.  I do think that this will improve with further conservative  treatment such as physical therapy, I will send her physical therapist a message regarding their thoughts on potential other activities that she may do to encourage gluteal strengthening.  In the future we could consider other interventions if she is not improving whatsoever.    It was a pleasure seeing Kamilah.        Sagar Gordillo DO, CAQSM      This note was constructed using Dragon dictation software, please excuse any minor errors in spelling, grammar, or syntax.        Again, thank you for allowing me to participate in the care of your patient.        Sincerely,        Sagar Gordillo DO

## 2022-03-17 NOTE — PROGRESS NOTES
HISTORY OF PRESENT ILLNESS  Ms. Perry is a pleasant 57 year old female following up with right hip pain.  Kamilah does have a history of right hip pain, specifically degenerative changes of the labrum and insertional tendinopathy of the gluteus musculature at the trochanter.  She last saw me for her right hip in August 2020, at that visit I injected her hip with corticosteroid.  Her hip is still bothering her, she has pain at the lateral aspect of her hip, as well as when she brings her hip across her body.  She points to the posterior aspect of her hip, specifically at the PSIS.     PHYSICAL EXAM  General  - normal appearance, in no obvious distress  Musculoskeletal - right hip  - stance: normal gait without limp  - inspection: no swelling or effusion, normal bone and joint alignment, no obvious deformity  - palpation: tender at some tissue lateral to PSIS  - ROM: normal flexion, extension, IR, ER, abduction, adduction, not painful, no crepitus  - strength: 5/5 in all planes  - special tests:  (-) DEMETRIA  (-) FADIR  Neuro  - no sensory or motor deficit, grossly normal coordination, normal muscle tone          ASSESSMENT & PLAN  Ms. Perry is a 57 year old female following up with right hip pain.    I again reviewed her MR in the room with her, this was done in October 2020 and does show degenerative changes at the labrum as well as insertional tendinopathy of the gluteus medius and minimus with a partial tear of the gluteus minimus.    Kamilah's pain is suggestive of an injury at the proximal portion of the gluteus, specifically at the gluteus la origin at the SI joint region.  I do think that this will improve with further conservative treatment such as physical therapy, I will send her physical therapist a message regarding their thoughts on potential other activities that she may do to encourage gluteal strengthening.  In the future we could consider other interventions if she is not improving  whatsoever.    It was a pleasure seeing Kamilah.        Sagar Gordillo DO, AMPARO      This note was constructed using Dragon dictation software, please excuse any minor errors in spelling, grammar, or syntax.

## 2022-03-21 ENCOUNTER — TELEPHONE (OUTPATIENT)
Dept: ORTHOPEDICS | Facility: CLINIC | Age: 57
End: 2022-03-21
Payer: COMMERCIAL

## 2022-03-21 NOTE — TELEPHONE ENCOUNTER
Patient was contacted today. She states the wrist brace is not helping her elbow pain, she reports it digs into her forearm. She is wondering what else she can to.      She would like to schedule therapy for her hip.

## 2022-03-21 NOTE — TELEPHONE ENCOUNTER
[facility] Call Center    Phone Message    May a detailed message be left on voicemail: yes     Reason for Call: Other: Patient stated her wrist band isn't working and would like to speak to a nurse.      Action Taken: Message routed to:  [facility] ortho    Travel Screening: Not Applicable

## 2022-04-01 NOTE — PROGRESS NOTES
Hand Therapy Initial Evaluation    Current Date:  4/4/2022  Referring Provider: Sagar Gordillo DO MD Order Date: 3/24/2022  MD Note: Custom wrist splint    Diagnosis: Pain in wrist, unspecified laterality, Left Elbow Pain  DOI: 2/7/2022    Subjective:  Kamilah Perry is a 57 year old female.    Answers for HPI/ROS submitted by the patient on 4/3/2022  Reason for Visit:: elbow pain when lifting things  When problem began:: 2/7/2022  How problem occurred:: pilates class  Number scale: 6/10  General health as reported by patient: good  Please check all that apply to your current or past medical history: none  Medical allergies: none  Surgeries: none  Medications you are currently taking: high blood pressure medication, thyroid medication  Occupation:: retail  What are your primary job tasks: computer work, lifting/carrying, pushing/pulling    Occupational Profile Information:  Right hand dominant  Prior functional level:  independent-shared household chores  Patient reports symptoms of pain, stiffness/loss of motion and weakness/loss of strength  Special tests:  none.    Previous treatment: wrapping the elbow, heating pad, otc medication  Barriers include:none  Mobility: No difficulty  Transportation: drives  Currently working in normal job without restrictions  Leisure activities/hobbies: walking, computer work, piliates, gardening    Functional Outcome Measure:   Upper Extremity Functional Index Score:  SCORE:   Column Totals: /80: (P) 44   (A lower score indicates greater disability.)    Objective:  Pain Level (Scale 0-10)   4/4/2022   At Rest 0/10   With Use 7/10     Pain Description  Date 4/4/2022   Location L LEP    Pain Quality Aching and Dull   Frequency intermittent     Pain is worst  daytime or nighttime   Exacerbated by  lifting, sleeping positions, gripping, twisting   Relieved by heat and otc medications   Progression Gradually worsening     Posture  Normal    Sensation  Decreased Radial Nerve distribution  per pt report    ROM  Pain Report: - none  + mild    ++ moderate    +++ severe   Wrist 4/4/2022 4/4/2022   AROM (PROM) R L   Extension 64 72   Flexion 75 71   RD 19 19   UD 32 27   Per observation, elbow ROM WNL across all planes.     Resisted Testing  Pain Report:  - none    + mild    ++ moderate    +++ severe    4/4/2022   Elbow Extension 5/5, 0/10   Elbow Flexion 5/5, 2/10   Supination  5/5, 0/10   Pronation 5/5, 2/10   Wrist Ext with RD, Elbow at side 5/5, 0/10   Wrist Ext with UD, Elbow at side 5/5, 0/10   Wrist Ext with RD, Elbow Ext 5/5, 6/10   Wrist Ext with UD, Elbow Ext 5/5, 4/10   Wrist Flex with RD, Elbow at side 5/5, 0/10   Wrist Flex with UD, Elbow at side 5/5, 0/10   Wrist Flex with RD, Elbow Ext 5/5, 6/10   Wrist Flex with UD, Elbow Ext 5/5, 5/10   EDC with Elbow at side 5/5, 3/10   Long Finger Test 4/5, 3/10     Neural Tension Testing  RNT: Radial Neurodynamic Test (based on RUPALI Darden's ULNT)   4/4/2022   0-5 Scale 3/5   Position:   0/5: Arm across abdomen in coronal plane  1/5: Depress shoulder, ER to neutral ABD shoulder to 45 degrees  2/5: IR shoulder to end range, keep elbow at 90 degrees  3/5: Extend elbow to 0 degrees  4/5: Fully pronate forearm  5/5: Flex wrist and fingers with UD  Notes:    (+) indicates beyond grade level but less than nursing home to next level    (-) indicates over nursing home to level    S1  onset/change of patient's symptoms    S2 definite stop point based on patient's discomfort level    Strength   (Measured in pounds)  Pain Report: - none  + mild    ++ moderate    +++ severe    4/4/2022 4/4/2022   Trials R L   1  2  3 58 32 (3/10 pain)   Average 58 32       4/4/2022 4/4/2022    R L   Elbow Ext NT NT     Palpation  Pain Report:  - none    + mild    ++ moderate    +++ severe    4/4/2022   Proximal Triceps 0/10   Spiral Groove 0/10   Distal Triceps 0/10   Anconeus 4/10   ECRB at LEP 2/10   ECU at LEP 2/10   EDC at LEP 4/10   Radial Head 2/10   Extensor Wad 2/10   PIN Site  5/10     Assessment:  Patient presents with symptoms inconsistent with diagnosis of left wrist pain, with conservative intervention.     Patient's limitations or Problem List includes:  Pain, Decreased ROM/motion, Increased edema, Sensory disturbance and Tightness in musculature of the left wrist which interferes with the patient's ability to perform Self Care Tasks (dressing, hygiene/toileting), Work Tasks, Sleep Patterns, Recreational Activities and Household Chores as compared to previous level of function.    Rehab Potential:  Good - Return to full activity, some limitations    Patient will benefit from skilled Occupational Therapy to increase ROM, flexibility, motion, overall strength,  strength, pinch strength and sensation and decrease pain to return to previous activity level and resume normal daily tasks and to reach their rehab potential.    Barriers to Learning:  No barrier    Communication Issues:  Patient appears to be able to clearly communicate and understand verbal and written communication and follow directions correctly.    Chart Review: Chart Review and Simple history review with patient    Identified Performance Deficits: bathing/showering, dressing, hygiene and grooming, health management and maintenance, home establishment and management, meal preparation and cleanup, shopping, sleep, work and leisure activities    Assessment of Occupational Performance:  5 or more Performance Deficits    Clinical Decision Making (Complexity): Low complexity    Treatment Explanation:  The following has been discussed with the patient:  RX ordered/plan of care  Anticipated outcomes  Possible risks and side effects    Plan:  Frequency:  1 X every other week, once daily  Duration:  for 8 weeks    Treatment Plan:    Modalities:    US and Paraffin   Therapeutic Exercise:    AROM, AAROM, PROM, Tendon Gliding, Isotonics, Isometrics and Stabilization  Therapeutic Activities:  Functional activities   Neuromuscular  re-ed:   Nerve Gliding and Kinesiotaping  Manual Techniques:   Friction massage, Myofascial release and Manual edema mobilization  Orthotic Fabrication:    Static  Self Care:    Self Care Tasks, Ergonomic Considerations and Work Tasks    Discharge Plan:  Achieve all LTG.  Independent in home treatment program.  Reach maximal therapeutic benefit.    Home Program:   TENNIS ELBOW PREVENTION  EMR Notes  HEP - Sets  Reps  Sessions per day  Notes  Warmth  EMR Notes  HEP - Sets  Reps  Sessions per day Moist heat for 5 minutes before exercises  Notes  Ball Massage to Extensors  EMR Notes  HEP - Sets  Reps  Sessions per day Up to 2 minutes prior to exercises  Notes  Friction Massage  EMR Notes  HEP - Sets  Reps  Sessions per day As needed  Notes 2-3 min Find tender spot at elbow, and go 1 direction over it  Forearm Passive Range of Motion Extensor Stretch  EMR Notes  HEP - Sets  Reps 10, hold 15-30 sec  Sessions per day 3-4  Notes PAIN FREE If painful, start with elbow at side, then slowly extend elbow  Forearm PROM Advanced Flexor Stretch  EMR Notes  HEP - Sets  Reps 10, hold 15-30 sec  Sessions per day 3-4  Notes hold 15-20 sec start with elbow at side, straighten elbow as far as you can without pain.  Nerve Gliding Proximal Radial  EMR Notes  HEP - Sets  Reps 10 (hold for 5 seconds)  Sessions per day 1-2  Notes    Next Visit:  Review HEP  Review taping and discuss splinting options  MFR/Friction Massage  Nerve gliding   US

## 2022-04-04 ENCOUNTER — THERAPY VISIT (OUTPATIENT)
Dept: OCCUPATIONAL THERAPY | Facility: CLINIC | Age: 57
End: 2022-04-04
Attending: FAMILY MEDICINE
Payer: COMMERCIAL

## 2022-04-04 DIAGNOSIS — M25.522 LEFT ELBOW PAIN: ICD-10-CM

## 2022-04-04 DIAGNOSIS — M25.539 PAIN IN WRIST, UNSPECIFIED LATERALITY: ICD-10-CM

## 2022-04-04 PROCEDURE — 97535 SELF CARE MNGMENT TRAINING: CPT | Mod: GO

## 2022-04-04 PROCEDURE — 97112 NEUROMUSCULAR REEDUCATION: CPT | Mod: GO

## 2022-04-04 PROCEDURE — 97110 THERAPEUTIC EXERCISES: CPT | Mod: GO

## 2022-04-04 PROCEDURE — 97165 OT EVAL LOW COMPLEX 30 MIN: CPT | Mod: GO

## 2022-04-05 ENCOUNTER — THERAPY VISIT (OUTPATIENT)
Dept: PHYSICAL THERAPY | Facility: CLINIC | Age: 57
End: 2022-04-05
Attending: FAMILY MEDICINE
Payer: COMMERCIAL

## 2022-04-05 DIAGNOSIS — G89.29 CHRONIC RIGHT-SIDED LOW BACK PAIN WITHOUT SCIATICA: ICD-10-CM

## 2022-04-05 DIAGNOSIS — M54.50 LOW BACK PAIN: ICD-10-CM

## 2022-04-05 DIAGNOSIS — M54.50 CHRONIC RIGHT-SIDED LOW BACK PAIN WITHOUT SCIATICA: ICD-10-CM

## 2022-04-05 DIAGNOSIS — M25.551 HIP PAIN, RIGHT: ICD-10-CM

## 2022-04-05 DIAGNOSIS — M25.522 LEFT ELBOW PAIN: Primary | ICD-10-CM

## 2022-04-05 PROCEDURE — 97140 MANUAL THERAPY 1/> REGIONS: CPT | Mod: GP | Performed by: PHYSICAL THERAPIST

## 2022-04-05 PROCEDURE — 97161 PT EVAL LOW COMPLEX 20 MIN: CPT | Mod: GP | Performed by: PHYSICAL THERAPIST

## 2022-04-05 PROCEDURE — 97110 THERAPEUTIC EXERCISES: CPT | Mod: GP | Performed by: PHYSICAL THERAPIST

## 2022-04-05 NOTE — PROGRESS NOTES
Physical Therapy Initial Evaluation  Subjective:  The history is provided by the patient. No  was used.   Patient Health History  Kamilah Perry being seen for low back/hip.     Problem began: 3/17/2022.   Problem occurred: Gradual Onset   Pain is reported as 2/10 on pain scale.  General health as reported by patient is good.  Pertinent medical history includes: none.   Red flags:  None as reported by patient.  Medical allergies: none.   Surgeries include:  None.    Current medications:  High blood pressure medication and thyroid medication.    Current occupation is retail.   Primary job tasks include:  Computer work, lifting/carrying and pushing/pulling.                  Therapist Generated HPI Evaluation  Problem details: Feels like a knot that is sometimes there and sometimes not. Does feel a little weaker on R side. Normally does pilates but hasn't been for the last few weeks. Did have a fall and hip issues in the past that since resolved and the pain is now more proximal towards the spine..         Type of problem:  Lumbar.    This is a new condition.  Condition occurred with:  Other reason (after dealing with R hip pain for a long time).  Where condition occurred: for unknown reasons.  Patient reports pain:  Lower lumbar spine.  Pain is described as other (Feels like a knot) and is intermittent.  Pain radiates to:  No radiation. Pain is the same all the time.  Since onset symptoms are unchanged.  Associated symptoms:  Loss of strength. Symptoms are exacerbated by lifting, walking, lying down and certain positions (R sided sleeper)  and relieved by other (Stretching/foam rolling).  Special tests included:  Other.  Previous treatment includes other. There was none improvement following previous treatment.  Restrictions due to condition include:  Working in normal job without restrictions.  Barriers include:  Other.                        Objective:  System    Physical Exam    General     ROS      Kamilah QUINTEROS [place] , : 1965, MRN: [id number]    Physical Therapy Objective Findings  Subjective information, goals, clinical impression, daily documentation and other information found in EPISODES tab.  Objective:     Lumbar Pain    Posture: Normal  Gait:  Normal  Lumbar Range of Motion:  Flexion                                                   WNL                                                                                                                        Extension WNL   Right Side Bending Limited to 85% with a feeling of being stuck   Left Side Bending WNL   Repeated extension- standing    Repeated flexion- standing      Pelvic screen:                                                                         Positive                                            Negative                                             Standing Forward Bend  x   Gillet(March)     Supine to sit     Sacroiliac provocation test  x   Pubic symphysis provocation            -resisted hip add at 46     Other:       Manual Muscle Testing (graded 0-5, measured at 0 degrees unless otherwise noted):                                                                              Right                                  Left                                                     Transversus Abdominus     -Yakov Leg Lowering (deg) Fair Fair   Hip Flex L2     Hip Abd     Hip Add     Hip Ext     Knee Flex     Knee Ext L3     Ankle Dorsiflexion L4     Great Toe Extension L5     Ankle Plantar Flexion S1     Other:     (+ mild pain, ++ moderate pain, +++ severe pain)    Special Tests:                                                                     Positive                                             Negative                                             Sign of Buttock  x   SLR     Tono Test     Ely Test     Prone instability Test     Crossover SLR     Repeated extension prone     Other:           Segmental Mobility: Hypomobile  L5    Palpation: Tenderness in R glute        Assessment/Plan:    Patient is a 57 year old female with lumbar complaints.    Patient has the following significant findings with corresponding treatment plan.                Diagnosis 1:  History and objective findings consistent with segmental irritation at L5    Pain -  manual therapy, self management, education and home program  Decreased joint mobility - manual therapy, therapeutic exercise, therapeutic activity and home program  Decreased function - therapeutic activities and home program    Therapy Evaluation Codes:   1) History comprised of:   Personal factors that impact the plan of care:      None.    Comorbidity factors that impact the plan of care are:      None.     Medications impacting care: High blood pressure.  2) Examination of Body Systems comprised of:   Body structures and functions that impact the plan of care:      Lumbar spine.   Activity limitations that impact the plan of care are:      Lifting.  3) Clinical presentation characteristics are:   Stable/Uncomplicated.  4) Decision-Making    Low complexity using standardized patient assessment instrument and/or measureable assessment of functional outcome.  Cumulative Therapy Evaluation is: Low complexity.    Previous and current functional limitations:  (See Goal Flow Sheet for this information)    Short term and Long term goals: (See Goal Flow Sheet for this information)     Communication ability:  Patient appears to be able to clearly communicate and understand verbal and written communication and follow directions correctly.  Treatment Explanation - The following has been discussed with the patient:   RX ordered/plan of care  Anticipated outcomes  Possible risks and side effects  This patient would benefit from PT intervention to resume normal activities.   Rehab potential is excellent.    Frequency:  1 X week, once daily  Duration:  for 6 weeks  Discharge Plan:  Achieve all LTG.  Independent  in home treatment program.  Reach maximal therapeutic benefit.    Please refer to the daily flowsheet for treatment today, total treatment time and time spent performing 1:1 timed codes.     Nathan Johnson SPT; Ihsan Jordan PT, OCS

## 2022-04-12 ENCOUNTER — THERAPY VISIT (OUTPATIENT)
Dept: PHYSICAL THERAPY | Facility: CLINIC | Age: 57
End: 2022-04-12
Payer: COMMERCIAL

## 2022-04-12 DIAGNOSIS — M25.551 HIP PAIN, RIGHT: ICD-10-CM

## 2022-04-12 DIAGNOSIS — G89.29 CHRONIC RIGHT-SIDED LOW BACK PAIN WITHOUT SCIATICA: Primary | ICD-10-CM

## 2022-04-12 DIAGNOSIS — M54.50 CHRONIC RIGHT-SIDED LOW BACK PAIN WITHOUT SCIATICA: Primary | ICD-10-CM

## 2022-04-12 PROCEDURE — 97110 THERAPEUTIC EXERCISES: CPT | Mod: GP | Performed by: PHYSICAL THERAPIST

## 2022-04-12 PROCEDURE — 97112 NEUROMUSCULAR REEDUCATION: CPT | Mod: GP | Performed by: PHYSICAL THERAPIST

## 2022-04-19 ENCOUNTER — THERAPY VISIT (OUTPATIENT)
Dept: OCCUPATIONAL THERAPY | Facility: CLINIC | Age: 57
End: 2022-04-19
Payer: COMMERCIAL

## 2022-04-19 DIAGNOSIS — M25.539 PAIN IN WRIST, UNSPECIFIED LATERALITY: Primary | ICD-10-CM

## 2022-04-19 DIAGNOSIS — M25.522 LEFT ELBOW PAIN: ICD-10-CM

## 2022-04-19 PROCEDURE — 97112 NEUROMUSCULAR REEDUCATION: CPT | Mod: GO

## 2022-04-19 PROCEDURE — 97140 MANUAL THERAPY 1/> REGIONS: CPT | Mod: GO

## 2022-04-26 ENCOUNTER — THERAPY VISIT (OUTPATIENT)
Dept: PHYSICAL THERAPY | Facility: CLINIC | Age: 57
End: 2022-04-26
Payer: COMMERCIAL

## 2022-04-26 DIAGNOSIS — M25.551 HIP PAIN, RIGHT: ICD-10-CM

## 2022-04-26 DIAGNOSIS — M54.50 CHRONIC RIGHT-SIDED LOW BACK PAIN WITHOUT SCIATICA: Primary | ICD-10-CM

## 2022-04-26 DIAGNOSIS — G89.29 CHRONIC RIGHT-SIDED LOW BACK PAIN WITHOUT SCIATICA: Primary | ICD-10-CM

## 2022-04-26 PROCEDURE — 97110 THERAPEUTIC EXERCISES: CPT | Mod: GP | Performed by: PHYSICAL THERAPIST

## 2022-05-04 ENCOUNTER — THERAPY VISIT (OUTPATIENT)
Dept: OCCUPATIONAL THERAPY | Facility: CLINIC | Age: 57
End: 2022-05-04
Payer: COMMERCIAL

## 2022-05-04 DIAGNOSIS — M25.522 LEFT ELBOW PAIN: ICD-10-CM

## 2022-05-04 DIAGNOSIS — M25.539 PAIN IN WRIST, UNSPECIFIED LATERALITY: Primary | ICD-10-CM

## 2022-05-04 PROCEDURE — 97112 NEUROMUSCULAR REEDUCATION: CPT | Mod: GO

## 2022-05-04 PROCEDURE — 97140 MANUAL THERAPY 1/> REGIONS: CPT | Mod: GO

## 2022-05-07 ENCOUNTER — HEALTH MAINTENANCE LETTER (OUTPATIENT)
Age: 57
End: 2022-05-07

## 2022-05-17 ENCOUNTER — THERAPY VISIT (OUTPATIENT)
Dept: OCCUPATIONAL THERAPY | Facility: CLINIC | Age: 57
End: 2022-05-17
Payer: COMMERCIAL

## 2022-05-17 DIAGNOSIS — M25.539 PAIN IN WRIST, UNSPECIFIED LATERALITY: Primary | ICD-10-CM

## 2022-05-17 DIAGNOSIS — M25.522 LEFT ELBOW PAIN: ICD-10-CM

## 2022-05-17 PROCEDURE — 97110 THERAPEUTIC EXERCISES: CPT | Mod: GO

## 2022-05-17 PROCEDURE — 97535 SELF CARE MNGMENT TRAINING: CPT | Mod: GO

## 2022-05-17 NOTE — PROGRESS NOTES
Hand Therapy Progress Note    Current Date:  5/17/2022  Referring Provider: Sagar Gordillo DO MD Order Date: 3/24/2022  MD Note: Custom wrist splint    Diagnosis: Pain in wrist, unspecified laterality, Left Elbow Pain  DOI: 2/7/2022    Reporting period is 4/4/2022 to 5/17/2022     Subjective:   Subjective changes noted by patient:  It's about the same. I didn't work for the past 3 days and thought I might see some change, but I didn't.  Functional changes noted by patient:  Improvement in Sleep Patterns, Recreational Activities and Household Chores  No Change to Self Care Tasks (dressing) and Work Tasks  Patient has noted adverse reaction to:  None    Functional Outcome Measure:  Upper Extremity Functional Index Score:  SCORE:   Column Totals: /80: 52   (A lower score indicates greater disability.)    Objective:  Pain Level (Scale 0-10)   4/4/2022 5/17/2022   At Rest 0/10 1/10   With Use 7/10 4/10     Pain Description  Date 4/4/2022 5/17/2022   Location L LEP  Extensor muscles & LEP   Pain Quality Aching and Dull Shooting, dull, throbbing   Frequency intermittent   intermittent   Pain is worst  daytime or nighttime Daytime or nighttime   Exacerbated by  lifting, sleeping positions, gripping, twisting lifting, sleeping positions, gripping, twisting   Relieved by heat and otc medications icing   Progression Gradually worsening Unchanged     Posture  Normal    Sensation  Decreased Radial Nerve distribution per pt report    ROM  Pain Report: - none  + mild    ++ moderate    +++ severe   Wrist 4/4/2022 4/4/2022 5/17/2022   AROM (PROM) R L L   Extension 64 72 72   Flexion 75 71 73   RD 19 19 19   UD 32 27 35   Per observation, elbow ROM WNL across all planes.     Resisted Testing  Pain Report:  - none    + mild    ++ moderate    +++ severe    4/4/2022 5/17/2022   Elbow Extension 5/5, 0/10    Elbow Flexion 5/5, 2/10 5/5, 0/10   Supination  5/5, 0/10    Pronation 5/5, 2/10 5/5, 0/10   Wrist Ext with RD, Elbow at side 5/5,  0/10    Wrist Ext with UD, Elbow at side 5/5, 0/10    Wrist Ext with RD, Elbow Ext 5/5, 6/10 5/5, 3/10   Wrist Ext with UD, Elbow Ext 5/5, 4/10 5/5, 1/10   Wrist Flex with RD, Elbow at side 5/5, 0/10    Wrist Flex with UD, Elbow at side 5/5, 0/10    Wrist Flex with RD, Elbow Ext 5/5, 6/10 5/5, 2/10   Wrist Flex with UD, Elbow Ext 5/5, 5/10 5/5, 2/10   EDC with Elbow at side 5/5, 3/10 5/5, 3/10   Long Finger Test 4/5, 3/10 4/5, 5/10     Neural Tension Testing  RNT: Radial Neurodynamic Test (based on DS Adelso's ULNT)   4/4/2022 5/17/2022   0-5 Scale 3/5 2-3/5   Position:   0/5: Arm across abdomen in coronal plane  1/5: Depress shoulder, ER to neutral ABD shoulder to 45 degrees  2/5: IR shoulder to end range, keep elbow at 90 degrees  3/5: Extend elbow to 0 degrees  4/5: Fully pronate forearm  5/5: Flex wrist and fingers with UD  Notes:    (+) indicates beyond grade level but less than residential to next level    (-) indicates over residential to level    S1  onset/change of patient's symptoms    S2 definite stop point based on patient's discomfort level    Strength   (Measured in pounds)  Pain Report: - none  + mild    ++ moderate    +++ severe    4/4/2022 4/4/2022 5/17/2022   Trials R L L   1  2  3 58 32 (3/10 pain) 33 (2/10 pain)   Average 58 32 3       4/4/2022 4/4/2022    R L   Elbow Ext NT NT     Palpation  Pain Report:  - none    + mild    ++ moderate    +++ severe    4/4/2022 5/17/2022   Proximal Triceps 0/10    Spiral Groove 0/10    Distal Triceps 0/10    Anconeus 4/10 0/10   ECRB at LEP 2/10 2/10   ECU at LEP 2/10 2/10   EDC at LEP 4/10 0/10   Radial Head 2/10 2/10   Extensor Wad 2/10 1/10   PIN Site 5/10 1/10     Please refer to the daily flowsheet for treatment provided today.     Assessment:  Response to therapy has been improvement to:  ROM of Wrist:  All Planes  Pain:  frequency is less, intensity of pain is decreased, duration of pain is decreased and less tender over affected area  Response to  therapy has been lack of progress in:  Strength:   and pinch  Paresthesias:  Radial nerve - unchanged    Overall Assessment:  Patient is progressing well and is ready to decrease frequency of treatment in the clinic.  Patient is becoming more independent in home exercise program  Patient would benefit from continued therapy to achieve rehab potential  Patient is requesting further evaluation from their referring provider.   STG/LTG:  STGoals have been reviewed and progress or achievement has occurred;  see goal sheet for details and updates.    Plan:  Frequency/Duration:  Recommend continuing to see patient  1 X a month, once daily  for 1 month   Patient reports they want to check in with their referring provider. If they cannot get an appointment, they will return to therapy. This note will be kept open for one month. If after this time the patient does not return to hand therapy, they will be discharged.   Appropriateness of Rx I have re-evaluated this patient and find that the nature, scope, duration and intensity of the therapy is appropriate for the medical condition of the patient.    Treatment Plan:  Modalities:    US and Paraffin   Therapeutic Exercise:    AROM, AAROM, PROM, Tendon Gliding, Isotonics, Isometrics and Stabilization  Therapeutic Activities:  Functional activities   Neuromuscular re-ed:   Nerve Gliding and Kinesiotaping  Manual Techniques:   Friction massage, Myofascial release and Manual edema mobilization  Orthotic Fabrication:    Static  Self Care:    Self Care Tasks, Ergonomic Considerations and Work Tasks    Discharge Plan:  Achieve all LTG.  Independent in home treatment program.  Reach maximal therapeutic benefit.    Home Program:   TENNIS ELBOW PREVENTION  EMR Notes  HEP - Sets  Reps  Sessions per day  Notes  Warmth  EMR Notes  HEP - Sets  Reps  Sessions per day Moist heat for 5 minutes before exercises  Notes  Ball Massage to Extensors  EMR Notes  HEP - Sets  Reps  Sessions per day  Up to 2 minutes prior to exercises  Notes  Friction Massage  EMR Notes  HEP - Sets  Reps  Sessions per day As needed  Notes 2-3 min Find tender spot at elbow, and go 1 direction over it  Forearm Passive Range of Motion Extensor Stretch  EMR Notes  HEP - Sets  Reps 10, hold 15-30 sec  Sessions per day 3-4  Notes PAIN FREE If painful, start with elbow at side, then slowly extend elbow  Forearm PROM Advanced Flexor Stretch  EMR Notes  HEP - Sets  Reps 10, hold 15-30 sec  Sessions per day 3-4  Notes hold 15-20 sec start with elbow at side, straighten elbow as far as you can without pain.  Nerve Gliding Proximal Radial  EMR Notes  HEP - Sets  Reps 10 (hold for 5 seconds)  Sessions per day 1-2  Notes    Next Visit:  MFR  Nerve gliding  Discuss bracing  K-taping

## 2022-06-07 ENCOUNTER — OFFICE VISIT (OUTPATIENT)
Dept: ORTHOPEDICS | Facility: CLINIC | Age: 57
End: 2022-06-07
Payer: COMMERCIAL

## 2022-06-07 DIAGNOSIS — M25.522 LEFT ELBOW PAIN: Primary | ICD-10-CM

## 2022-06-07 DIAGNOSIS — M77.12 LATERAL EPICONDYLITIS OF LEFT ELBOW: ICD-10-CM

## 2022-06-07 PROCEDURE — 99213 OFFICE O/P EST LOW 20 MIN: CPT | Performed by: FAMILY MEDICINE

## 2022-06-07 NOTE — PATIENT INSTRUCTIONS
Thanks for coming today.  Ortho/Sports Medicine Clinic  [address] [place] [address]    To schedule future appointments in Ortho Clinic, you may call [phone].    To schedule ordered imaging by your Provider: Call [facility] at [phone]    [employer] available online at:   [email or web address]    Please call if any further questions or concerns [phone] and ask for the Orthopedic Department. Clinic hours 8 am to 5 pm.    Return to clinic if symptoms worsen.

## 2022-06-07 NOTE — LETTER
6/7/2022         RE: Kamilah Perry  [address] [facility] [address]        Dear Colleague,    Thank you for referring your patient, Kamilah Perry, to the [facility] [place]. Please see a copy of my visit note below.    HISTORY OF PRESENT ILLNESS  Ms. Perry is a pleasant 57 year old female following up with left elbow pain.  Kamilah has had left elbow pain since January of this past year, she felt an immediate pull in her elbow while she was doing a lift in Pilates.  This is that the lateral aspect of her elbow.  She has tried a wrist brace while sleeping and while awake over the past few months, she has also been to hand therapy.  Unfortunately he did not bring her any long-term relief.     PHYSICAL EXAM  General  - normal appearance, in no obvious distress  HEENT  - conjunctivae not injected, moist mucous membranes  CV  - normal radial pulse  Pulm  - normal respiratory pattern, non-labored  Musculoskeletal - left elbow  - inspection: normal joint alignment, no obvious deformity  - palpation: tender at the origin of the common extensor tendon  - strength: 5/5 wrist extension with elbow flexed, 4/5 with elbow extended, painful resisted extension of long finger with elbow flexed, worse with extension, 5/5  strength  Neuro  - no sensory or motor deficit, grossly normal coordination, normal muscle tone  Skin  - no ecchymosis, erythema, warmth, or induration, no obvious rash  Psych  - interactive, appropriate, normal mood and affect          ASSESSMENT & PLAN  Ms. Perry is a 57 year old female following up with left elbow pain.    I ordered & independently reviewed an x-ray of her elbow, this shows no obvious acute or chronic issues.    Given her failure of conservative therapy thus far I am ordering a left elbow MRI.  I will get in touch with her with the results.    It was a pleasure seeing Kamilah.        Sagar Gordillo, DO, CAQSM      This note was constructed  using Dragon dictation software, please excuse any minor errors in spelling, grammar, or syntax.        Again, thank you for allowing me to participate in the care of your patient.        Sincerely,        Sagar Gordillo, DO

## 2022-06-07 NOTE — PROGRESS NOTES
HISTORY OF PRESENT ILLNESS  Ms. Perry is a pleasant 57 year old female following up with left elbow pain.  Kamilah has had left elbow pain since January of this past year, she felt an immediate pull in her elbow while she was doing a lift in Pilates.  This is that the lateral aspect of her elbow.  She has tried a wrist brace while sleeping and while awake over the past few months, she has also been to hand therapy.  Unfortunately he did not bring her any long-term relief.     PHYSICAL EXAM  General  - normal appearance, in no obvious distress  HEENT  - conjunctivae not injected, moist mucous membranes  CV  - normal radial pulse  Pulm  - normal respiratory pattern, non-labored  Musculoskeletal - left elbow  - inspection: normal joint alignment, no obvious deformity  - palpation: tender at the origin of the common extensor tendon  - strength: 5/5 wrist extension with elbow flexed, 4/5 with elbow extended, painful resisted extension of long finger with elbow flexed, worse with extension, 5/5  strength  Neuro  - no sensory or motor deficit, grossly normal coordination, normal muscle tone  Skin  - no ecchymosis, erythema, warmth, or induration, no obvious rash  Psych  - interactive, appropriate, normal mood and affect          ASSESSMENT & PLAN  Ms. Perry is a 57 year old female following up with left elbow pain.    I ordered & independently reviewed an x-ray of her elbow, this shows no obvious acute or chronic issues.    Given her failure of conservative therapy thus far I am ordering a left elbow MRI.  I will get in touch with her with the results.    It was a pleasure seeing Kamilah.        Sagar Gordillo, DO, CAQSM      This note was constructed using Dragon dictation software, please excuse any minor errors in spelling, grammar, or syntax.

## 2022-06-13 PROBLEM — M25.551 HIP PAIN, RIGHT: Status: RESOLVED | Noted: 2022-04-05 | Resolved: 2022-06-13

## 2022-06-13 PROBLEM — M54.50 CHRONIC RIGHT-SIDED LOW BACK PAIN WITHOUT SCIATICA: Status: RESOLVED | Noted: 2022-04-05 | Resolved: 2022-06-13

## 2022-06-13 PROBLEM — G89.29 CHRONIC RIGHT-SIDED LOW BACK PAIN WITHOUT SCIATICA: Status: RESOLVED | Noted: 2022-04-05 | Resolved: 2022-06-13

## 2022-06-13 NOTE — PROGRESS NOTES
Discharge Note    Progress reporting period is from initial evaluation date (please see noted date below) to Apr 26, 2022.  Linked Episodes   Type: Episode: Status: Noted: Resolved: Last update: Updated by:   PHYSICAL THERAPY Low Back 4/5/2022 Active 4/5/2022 4/26/2022 10:23 AM Ihsan Jordan, PT      Comments:       Kamilah failed to follow up and current status is unknown.  Please see information below for last relevant information on current status.  Patient seen for 3 visits.    SUBJECTIVE  Subjective changes noted by patient:  Symptoms have been worsening. Working a lot which involves squatting a lot. 2 weeks ago got shoe inserts for plantar fascitis, and since then knees, hips and back have been more sore/painful.  .  Current pain level is 3/10 (5/10 at its worst; more painful when standing/sitting still).     Previous pain level was  3/10.   Changes in function:  Yes (See Goal flowsheet attached for changes in current functional level)  Adverse reaction to treatment or activity: None    OBJECTIVE  Changes noted in objective findings: Front plank on hands 40sec; Diastis recti noticed during abdominal     ASSESSMENT/PLAN  Diagnosis: Low back pain   Updated problem list and treatment plan:   Pain - HEP  Decreased ROM/flexibility - HEP  Decreased function - HEP  STG/LTGs have been met or progress has been made towards goals:  Yes, please see goal flowsheet for most current information  Assessment of Progress: current status is unknown.    Last current status: Pt is progressing slower than anticipated   Self Management Plans:  HEP  I have re-evaluated this patient and find that the nature, scope, duration and intensity of the therapy is appropriate for the medical condition of the patient.  Kamilah continues to require the following intervention to meet STG and LTG's:  HEP.    Recommendations:  Discharge with current home program.  Patient to follow up with MD as needed.    Please refer to the daily flowsheet  for treatment today, total treatment time and time spent performing 1:1 timed codes.    Ihsan Jordan,PT, DPT, OCS

## 2022-06-29 ENCOUNTER — ANCILLARY PROCEDURE (OUTPATIENT)
Dept: MRI IMAGING | Facility: CLINIC | Age: 57
End: 2022-06-29
Attending: FAMILY MEDICINE
Payer: COMMERCIAL

## 2022-06-29 DIAGNOSIS — M77.12 LATERAL EPICONDYLITIS OF LEFT ELBOW: ICD-10-CM

## 2022-06-29 PROCEDURE — 73221 MRI JOINT UPR EXTREM W/O DYE: CPT | Mod: LT | Performed by: RADIOLOGY

## 2022-06-30 ENCOUNTER — TELEPHONE (OUTPATIENT)
Dept: ORTHOPEDICS | Facility: CLINIC | Age: 57
End: 2022-06-30

## 2022-07-19 ENCOUNTER — VIRTUAL VISIT (OUTPATIENT)
Dept: ORTHOPEDICS | Facility: CLINIC | Age: 57
End: 2022-07-19
Payer: COMMERCIAL

## 2022-07-19 ENCOUNTER — MYC MEDICAL ADVICE (OUTPATIENT)
Dept: ORTHOPEDICS | Facility: CLINIC | Age: 57
End: 2022-07-19

## 2022-07-19 DIAGNOSIS — M77.12 LATERAL EPICONDYLITIS OF LEFT ELBOW: Primary | ICD-10-CM

## 2022-07-19 PROCEDURE — 99213 OFFICE O/P EST LOW 20 MIN: CPT | Mod: 95 | Performed by: FAMILY MEDICINE

## 2022-07-19 NOTE — LETTER
July 22, 2022      Kamilah Perry  [address] [facility] [address]              Dear Sir martha Louis,    Ms. Kamilah Perry is under the care of our orthopedic office for lateral epicondylitis.  I specifically, she has severe tendinosis with a high-grade partial tear of the common extensor tendon at the elbow.  She has failed conservative therapy, this greatly impacts her work and activities of daily living.    It is my professional opinion that she would benefit from a higher level, nonoperative procedure, as supported by her failure of conservative therapy thus far.  Injecting her common extensor tendon with leukocyte rich platelet rich plasma under ultrasound guidance has shown some early evidence of superiority when compared to percutaneous tenotomy.  Please accept this letter as my official recommendation of treatment and pulley for potential insurance coverage, as I do believe that Kamilah will have a better clinical outcome with a platelet rich plasma injection compared to other treatments.    Please call with questions or concerns, or if clarification is necessary.    Sincerely,            Sagar Gordillo, DO CAQSM

## 2022-07-19 NOTE — LETTER
7/19/2022         RE: Kamilah Perry  [address]   [place] [address]        Dear Colleague,    Thank you for referring your patient, Kamilah Perry, to the [facility]. Please see a copy of my visit note below.    Kamilah is a 57 year old who is being evaluated via a billable video visit.      Assessment & Plan     Lateral epicondylitis of left elbow  Kamilha and I reviewed her MRI, this does reveal marked tendinosis with a high-grade partial tear.    We had a good discussion centering around potential treatment strategies, given her lack of improvement thus far I do think it is reasonable to entertain either PRP or percutaneous tenotomy with Tenex, as both of these are nonoperative treatment options that have proven to be effective.  Given her partial tear I do favor PRP, as there is abutting evidence that PRP may be more effective in helping symptoms related to a partial tear.     Kamilah is going to contemplate treatments, we could consider either these treatments in the near future.    It was a pleasure seeing Kamilah.    Sagar Gordillo, DO  [facility]    Subjective   Kamilah is a 57 year old woman following up with lateral epicondylitis of her left elbow  HPI     Kamilah is on the video today to review her MRI.  Unfortunately her symptoms are not improved.    Review of Systems         Objective           Vitals:  No vitals were obtained today due to virtual visit.    Physical Exam   GENERAL: Healthy, alert and no distress  EYES: Eyes grossly normal to inspection.  No discharge or erythema, or obvious scleral/conjunctival abnormalities.  RESP: No audible wheeze, cough, or visible cyanosis.  No visible retractions or increased work of breathing.    SKIN: Visible skin clear. No significant rash, abnormal pigmentation or lesions.  NEURO: Cranial nerves grossly intact.  Mentation and speech appropriate for age.  PSYCH:  Mentation appears normal, affect normal/bright, judgement and insight intact, normal speech and appearance well-groomed.                Video-Visit Details    Video Start Time: 0940    Type of service:  Video Visit    Video End Time:0956    Originating Location (pt. Location): Home    Distant Location (provider location):  [facility]     Platform used for Video Visit: Actiance    .  ..        Again, thank you for allowing me to participate in the care of your patient.        Sincerely,        Sagar Gordillo, DO

## 2022-07-19 NOTE — PROGRESS NOTES
Kamilah is a 57 year old who is being evaluated via a billable video visit.      Assessment & Plan     Lateral epicondylitis of left elbow  Kamilah and I reviewed her MRI, this does reveal marked tendinosis with a high-grade partial tear.    We had a good discussion centering around potential treatment strategies, given her lack of improvement thus far I do think it is reasonable to entertain either PRP or percutaneous tenotomy with Tenex, as both of these are nonoperative treatment options that have proven to be effective.  Given her partial tear I do favor PRP, as there is abutting evidence that PRP may be more effective in helping symptoms related to a partial tear.     Kamilah is going to contemplate treatments, we could consider either these treatments in the near future.    It was a pleasure seeing Kamilah.    Sagar Gordillo DO  [facility] [place]    Subjective   Kamilah is a 57 year old woman following up with lateral epicondylitis of her left elbow  HPI     Kamilah is on the video today to review her MRI.  Unfortunately her symptoms are not improved.    Review of Systems         Objective           Vitals:  No vitals were obtained today due to virtual visit.    Physical Exam   GENERAL: Healthy, alert and no distress  EYES: Eyes grossly normal to inspection.  No discharge or erythema, or obvious scleral/conjunctival abnormalities.  RESP: No audible wheeze, cough, or visible cyanosis.  No visible retractions or increased work of breathing.    SKIN: Visible skin clear. No significant rash, abnormal pigmentation or lesions.  NEURO: Cranial nerves grossly intact.  Mentation and speech appropriate for age.  PSYCH: Mentation appears normal, affect normal/bright, judgement and insight intact, normal speech and appearance well-groomed.                Video-Visit Details    Video Start Time: 0940    Type of service:  Video Visit    Video End Time:0956    Originating Location (pt. Location):  Home    Distant Location (provider location):  [facility]     Platform used for Video Visit: [employer]

## 2022-07-19 NOTE — LETTER
7/19/2022         RE: Kamilah Perry  [address]   [place] [address]        Dear Colleague,    Thank you for referring your patient, Kamilah Perry, to the [facility]. Please see a copy of my visit note below.    Kamilah is a 57 year old who is being evaluated via a billable video visit.      Assessment & Plan     Lateral epicondylitis of left elbow  Kamilah and I reviewed her MRI, this does reveal marked tendinosis with a high-grade partial tear.    We had a good discussion centering around potential treatment strategies, given her lack of improvement thus far I do think it is reasonable to entertain either PRP or percutaneous tenotomy with Tenex, as both of these are nonoperative treatment options that have proven to be effective.  Given her partial tear I do favor PRP, as there is abutting evidence that PRP may be more effective in helping symptoms related to a partial tear.     Kamilah is going to contemplate treatments, we could consider either these treatments in the near future.    It was a pleasure seeing Kamilah.    Sagar Gordillo, DO  [facility]    Subjective   Kamilah is a 57 year old woman following up with lateral epicondylitis of her left elbow  HPI     Kamilah is on the video today to review her MRI.  Unfortunately her symptoms are not improved.    Review of Systems         Objective           Vitals:  No vitals were obtained today due to virtual visit.    Physical Exam   GENERAL: Healthy, alert and no distress  EYES: Eyes grossly normal to inspection.  No discharge or erythema, or obvious scleral/conjunctival abnormalities.  RESP: No audible wheeze, cough, or visible cyanosis.  No visible retractions or increased work of breathing.    SKIN: Visible skin clear. No significant rash, abnormal pigmentation or lesions.  NEURO: Cranial nerves grossly intact.  Mentation and speech appropriate for age.  PSYCH:  Mentation appears normal, affect normal/bright, judgement and insight intact, normal speech and appearance well-groomed.                Video-Visit Details    Video Start Time: 0940    Type of service:  Video Visit    Video End Time:0956    Originating Location (pt. Location): Home    Distant Location (provider location):  [facility]     Platform used for Video Visit: [employer]    .  ..        Again, thank you for allowing me to participate in the care of your patient.        Sincerely,        Sagar Gordillo, DO

## 2022-07-20 ENCOUNTER — TELEPHONE (OUTPATIENT)
Dept: ORTHOPEDICS | Facility: CLINIC | Age: 57
End: 2022-07-20

## 2022-07-20 NOTE — TELEPHONE ENCOUNTER
Good Morning,     What is the CPT code for the PRP injection and diagnosis code? Will this injection be a one time only injection or will it be a series?    Thanks!    Yamini Her    Financial Counselor  [address] [place], [place] [address] [place] [address]  Phone- [phone]  Fax- [phone]

## 2022-07-20 NOTE — TELEPHONE ENCOUNTER
7/20 Called and spoke to patient. She is currently scheduled for prp injection with Dr. Gordillo.     I also gave patient the phone number for financial counselors so they can work together and confirm if patient can have prp injection or teenex.     Loni key Procedure   Orthopedics, Podiatry, Sports Medicine, ENT/Eye Specialties  [facility]   [phone]

## 2022-07-20 NOTE — TELEPHONE ENCOUNTER
[facility] Call Center    Phone Message    May a detailed message be left on voicemail: yes     Reason for Call: Other: Pt sent Mobilligyt message for the dr and she wanted him to see it asap and she would like to set up surg      Action Taken: Other: otho     Travel Screening: Not Applicable

## 2022-07-22 NOTE — TELEPHONE ENCOUNTER
PB DOS: 8/11/2022 *LMN and records uploaded to the portal    Type of Procedure: PRP injection x1 joint  CPT Codes: 0232T  ICD10 Codes: M77.12- Lateral Epicondylitis tear left elbow  Surgeon/Ordering provider: Dr. Sagar Gordillo- [id number]  Pre-cert/Authorization completed:  Pre-determination request submitted for review  Payer: [facility]  Spoke to Availity portal  Ref. # [id number] Auth #   Valid Dates:     If denied the cost will be $800.00 for 1 joint or $1200.00 for 2 joints.      Per the Dr. Gordillo:    Code is 0232T     One time injection.     Lateral epicondylitis with high grade partial tear.     I also wrote a letter of appeal.    Routing comment

## 2022-07-28 NOTE — TELEPHONE ENCOUNTER
Dr Gordillo and team,      PB DOS: 8/11/2022   Type of Procedure: PRP injection x1 joint  CPT Codes: 0232T  ICD10 Codes: M77.12- Lateral Epicondylitis tear left elbow  Surgeon/Ordering provider: Dr. Sagar Gordillo- [id number]  Pre-cert/Authorization completed:  Pre-determination -Denied via fax  Payer: [facility]  Spoke to Availity portal  Ref. # [id number]  Valid Dates: 7/22/22-1/17/2022     Denied cost will be $800.00 for 1 joint or $1200.00 for 2 joints  Left vm informing of denial and cost    SHELTON Medellin  Financial Counselor  [facility]  [address] [place] [address]  [phone]

## 2022-07-28 NOTE — TELEPHONE ENCOUNTER
Pt called call center and I spoke to them. Pt would like to look into Tenex procedure. I advised that this is done through our surgery center and not a clinic appt so she should not scheduled an office visit for this. I also advised that we would need to make sure this has been approved through financial and that we would contact her for follow up.    Omar Oswald RN

## 2022-08-04 PROBLEM — M77.12 LATERAL EPICONDYLITIS OF LEFT ELBOW: Status: ACTIVE | Noted: 2022-08-04

## 2022-08-11 ENCOUNTER — HOSPITAL ENCOUNTER (OUTPATIENT)
Facility: AMBULATORY SURGERY CENTER | Age: 57
Discharge: HOME | End: 2022-08-11
Attending: FAMILY MEDICINE | Admitting: FAMILY MEDICINE
Payer: COMMERCIAL

## 2022-08-11 VITALS
OXYGEN SATURATION: 100 % | RESPIRATION RATE: 16 BRPM | TEMPERATURE: 97 F | HEART RATE: 70 BPM | SYSTOLIC BLOOD PRESSURE: 117 MMHG | DIASTOLIC BLOOD PRESSURE: 69 MMHG

## 2022-08-11 DIAGNOSIS — M77.12 LATERAL EPICONDYLITIS OF LEFT ELBOW: ICD-10-CM

## 2022-08-11 DIAGNOSIS — M25.522 LEFT ELBOW PAIN: Primary | ICD-10-CM

## 2022-08-11 PROCEDURE — G8907 PT DOC NO EVENTS ON DISCHARG: HCPCS

## 2022-08-11 PROCEDURE — 24357 REPAIR ELBOW PERC: CPT | Mod: LT | Performed by: FAMILY MEDICINE

## 2022-08-11 PROCEDURE — 24357 REPAIR ELBOW PERC: CPT | Mod: LT

## 2022-08-11 PROCEDURE — G8918 PT W/O PREOP ORDER IV AB PRO: HCPCS

## 2022-08-11 RX ORDER — TRAMADOL HYDROCHLORIDE 50 MG/1
50 TABLET ORAL EVERY 6 HOURS PRN
Qty: 10 TABLET | Refills: 0 | Status: SHIPPED | OUTPATIENT
Start: 2022-08-11 | End: 2022-08-14

## 2022-08-11 RX ORDER — HYDROCHLOROTHIAZIDE 12.5 MG/1
25 CAPSULE ORAL EVERY MORNING
COMMUNITY

## 2022-08-11 RX ORDER — LIDOCAINE HYDROCHLORIDE 10 MG/ML
INJECTION, SOLUTION INFILTRATION; PERINEURAL PRN
Status: DISCONTINUED | OUTPATIENT
Start: 2022-08-11 | End: 2022-08-11 | Stop reason: HOSPADM

## 2022-08-11 NOTE — DISCHARGE INSTRUCTIONS
Elbow Percutaneous Tenotomy Procedure - After Care Instructions    Rest arm and hand today    May resume non-repetitive use of arm and hand in 2 days. Gentle range of motion is encouraged, light typing only if not painful.    No lifting objects with arm/hand greater than 5 pounds for 6 weeks.    Keep bandages and procedure area clean and dry.    Do not bathe (submerge arm in water) arm for 1 week, showering is ok.    If you experience discomfort in the first few days after the procedure, you may use application of an ice pack over the elbow. Keep in place for 20 minutes and then remove for at least 20 minutes before reapplying if necessary. You may also use acetaminophen (Tylenol) as directed on container.    Tramadol will be prescribed for pain after the procedure, if needed.    If you notice increasing redness, warmth and pain, fever, or drainage from the wound then notify our office at [phone]    Return to this clinic for your follow-up visit in 2 weeks.

## 2022-08-12 ENCOUNTER — MYC MEDICAL ADVICE (OUTPATIENT)
Dept: ORTHOPEDICS | Facility: CLINIC | Age: 57
End: 2022-08-12

## 2022-08-12 NOTE — TELEPHONE ENCOUNTER
[facility] Call Center    Phone Message    May a detailed message be left on voicemail: yes     Reason for Call: Other: pt calling in regarding post op instructions. Pt asking for call back at [phone].  pt aslo asking about when she should be scheduled for post op.       Action Taken: Message routed to:  [facility] Sports Med - Dr. Sagar Collins     Travel Screening: Not Applicable

## 2022-08-12 NOTE — TELEPHONE ENCOUNTER
Patient was contacted to discuss post op care after her tenex procedure. She is able to remove the compression wrap, she reports finger swelling, she was encourage to elevate and apply ice. A two week follow up was also made.

## 2022-08-29 ENCOUNTER — VIRTUAL VISIT (OUTPATIENT)
Dept: ORTHOPEDICS | Facility: CLINIC | Age: 57
End: 2022-08-29
Payer: COMMERCIAL

## 2022-08-29 DIAGNOSIS — M77.12 LATERAL EPICONDYLITIS OF LEFT ELBOW: Primary | ICD-10-CM

## 2022-08-29 PROCEDURE — 99024 POSTOP FOLLOW-UP VISIT: CPT | Performed by: FAMILY MEDICINE

## 2022-08-29 NOTE — LETTER
8/29/2022         RE: Kamilah ePrry  [address]  [place] [address]        Dear Colleague,    Thank you for referring your patient, Kamilah Perry, to the [facility]. Please see a copy of my visit note below.    Kamilah is a 57 year old who is being evaluated via a billable telephone visit.      Assessment & Plan     Lateral epicondylitis of left elbow  I am happy Kamilah is doing well thus far, we did discuss that while she may have good days and bad days she should continue to improve on a weekly basis.  She can increase her lifting restriction to 10 pounds, and return to work unrestricted, as long as she is doing well.  I am also referring her to hand therapy.  - Occupational Therapy Referral; Future    She should follow-up in 3 to 4 weeks to reassess.      Sagar Gordillo DO  [facility]    Subjective   Kamilah is a 57 year old woman following up after percutaneous tenotomy with Tenex.  Her procedure was on August 11.  She is feeling better over the past week, her pain is now back to baseline compared to prior to her procedure.    HPI     Review of Systems         Objective           Vitals:  No vitals were obtained today due to virtual visit.    Physical Exam   healthy, alert and no distress  PSYCH: Alert and oriented times 3; coherent speech, normal   rate and volume, able to articulate logical thoughts, able   to abstract reason, no tangential thoughts, no hallucinations   or delusions  Her affect is normal  RESP: No cough, no audible wheezing, able to talk in full sentences  Remainder of exam unable to be completed due to telephone visits          Phone call duration: 9 minutes    .  ..        Again, thank you for allowing me to participate in the care of your patient.        Sincerely,        Sagar Gordillo DO

## 2022-08-29 NOTE — LETTER
8/29/2022         RE: Kamilah Perry  [address]  [place] [address]        Dear Colleague,    Thank you for referring your patient, Kamilah Perry, to the [facility]. Please see a copy of my visit note below.    Kamilah is a 57 year old who is being evaluated via a billable telephone visit.      Assessment & Plan     Lateral epicondylitis of left elbow  I am happy Kamilah is doing well thus far, we did discuss that while she may have good days and bad days she should continue to improve on a weekly basis.  She can increase her lifting restriction to 10 pounds, and return to work unrestricted, as long as she is doing well.  I am also referring her to hand therapy.  - Occupational Therapy Referral; Future    She should follow-up in 3 to 4 weeks to reassess.      Sagar Gordillo DO  [facility]    Subjective   Kamilah is a 57 year old woman following up after percutaneous tenotomy with Tenex.  Her procedure was on August 11.  She is feeling better over the past week, her pain is now back to baseline compared to prior to her procedure.    HPI     Review of Systems         Objective           Vitals:  No vitals were obtained today due to virtual visit.    Physical Exam   healthy, alert and no distress  PSYCH: Alert and oriented times 3; coherent speech, normal   rate and volume, able to articulate logical thoughts, able   to abstract reason, no tangential thoughts, no hallucinations   or delusions  Her affect is normal  RESP: No cough, no audible wheezing, able to talk in full sentences  Remainder of exam unable to be completed due to telephone visits          Phone call duration: 9 minutes    .  ..        Again, thank you for allowing me to participate in the care of your patient.        Sincerely,        Sagar Gordillo DO

## 2022-08-29 NOTE — PROGRESS NOTES
Kamilah is a 57 year old who is being evaluated via a billable telephone visit.      Assessment & Plan     Lateral epicondylitis of left elbow  I am happy Kamilah is doing well thus far, we did discuss that while she may have good days and bad days she should continue to improve on a weekly basis.  She can increase her lifting restriction to 10 pounds, and return to work unrestricted, as long as she is doing well.  I am also referring her to hand therapy.  - Occupational Therapy Referral; Future    She should follow-up in 3 to 4 weeks to reassess.      Sagar Gordillo, [facility] [place]    Monica Triana is a 57 year old woman following up after percutaneous tenotomy with Tenex.  Her procedure was on August 11.  She is feeling better over the past week, her pain is now back to baseline compared to prior to her procedure.    HPI     Review of Systems         Objective           Vitals:  No vitals were obtained today due to virtual visit.    Physical Exam   healthy, alert and no distress  PSYCH: Alert and oriented times 3; coherent speech, normal   rate and volume, able to articulate logical thoughts, able   to abstract reason, no tangential thoughts, no hallucinations   or delusions  Her affect is normal  RESP: No cough, no audible wheezing, able to talk in full sentences  Remainder of exam unable to be completed due to telephone visits          Phone call duration: 9 minutes    .  ..

## 2022-10-25 ENCOUNTER — OFFICE VISIT (OUTPATIENT)
Dept: ORTHOPEDICS | Facility: CLINIC | Age: 57
End: 2022-10-25
Payer: COMMERCIAL

## 2022-10-25 DIAGNOSIS — M77.12 LATERAL EPICONDYLITIS OF LEFT ELBOW: Primary | ICD-10-CM

## 2022-10-25 PROCEDURE — 99024 POSTOP FOLLOW-UP VISIT: CPT | Performed by: FAMILY MEDICINE

## 2022-10-25 ASSESSMENT — PAIN SCALES - GENERAL: PAINLEVEL: NO PAIN (0)

## 2022-10-25 NOTE — PROGRESS NOTES
HISTORY OF PRESENT ILLNESS  Ms. Perry is a pleasant 57 year old female following up with lateral epicondylitis.  Kamilah underwent a percutaneous tenotomy with Tenex on August 11 of this year.  Fortunately she is doing quite a bit better.  She states that she is nearly fully improved with regards to her elbow pain.  She has no functional limitations at this point.  She does have some pain at the tricep muscle in her left arm.     PHYSICAL EXAM  General  - normal appearance, in no obvious distress    Musculoskeletal - left elbow  - inspection: normal joint alignment, no obvious deformity, no swelling  - palpation: no bony or soft tissue tenderness  - ROM:  160 deg flexion   0 deg extension   90 deg pronation   90 deg supination  - strength: 5/5  strength, 5/5 flexion, extension, pronation, supination  Neuro  - no sensory or motor deficit, grossly normal coordination, normal muscle tone  Skin  - no ecchymosis, erythema, warmth, or induration, no obvious rash        ASSESSMENT & PLAN  Ms. Perry is a 57 year old female following up with lateral epicondylitis, status post percutaneous tenotomy with Tenex.    Kamilah is just over 10 weeks out from her procedure and is doing great.  At this point I do think she can be cleared with regards to any physical activity that she would like to do.  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    With regards to her pain in her tricep it does seem that this is likely due to the relative underuse and surrounding injury of her elbow.  She is going to keep an eye on this, if this gets worse she should follow-up, I may consider imaging if this is the case.    It was a pleasure seeing Kamilah.        Sagar Gordillo DO, CAQSM      This note was constructed using Dragon dictation software, please excuse any minor errors in spelling, grammar, or syntax.

## 2022-10-25 NOTE — LETTER
10/25/2022         RE: Kamilah Perry  [address]  [place] [address]        Dear Colleague,    Thank you for referring your patient, Kamilah Perry, to the [facility] [place]. Please see a copy of my visit note below.    HISTORY OF PRESENT ILLNESS  Ms. Perry is a pleasant 57 year old female following up with lateral epicondylitis.  Kamilah underwent a percutaneous tenotomy with Tenex on August 11 of this year.  Fortunately she is doing quite a bit better.  She states that she is nearly fully improved with regards to her elbow pain.  She has no functional limitations at this point.  She does have some pain at the tricep muscle in her left arm.     PHYSICAL EXAM  General  - normal appearance, in no obvious distress    Musculoskeletal - left elbow  - inspection: normal joint alignment, no obvious deformity, no swelling  - palpation: no bony or soft tissue tenderness  - ROM:  160 deg flexion   0 deg extension   90 deg pronation   90 deg supination  - strength: 5/5  strength, 5/5 flexion, extension, pronation, supination  Neuro  - no sensory or motor deficit, grossly normal coordination, normal muscle tone  Skin  - no ecchymosis, erythema, warmth, or induration, no obvious rash        ASSESSMENT & PLAN  Ms. Perry is a 57 year old female following up with lateral epicondylitis, status post percutaneous tenotomy with Tenex.    Kamilah is just over 10 weeks out from her procedure and is doing great.  At this point I do think she can be cleared with regards to any physical activity that she would like to do.  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    With regards to her pain in her tricep it does seem that this is likely due to the relative underuse and surrounding injury of her elbow.  She is going to keep an eye on this, if this gets worse she should follow-up, I may consider imaging if this is the  case.    It was a pleasure seeing Kamilah.        Sagar Gordillo DO, CAQSM      This note was constructed using Dragon dictation software, please excuse any minor errors in spelling, grammar, or syntax.        Again, thank you for allowing me to participate in the care of your patient.        Sincerely,        Sagar Gordillo DO

## 2022-10-25 NOTE — NURSING NOTE
Chief Complaint   Patient presents with     Left Elbow - Follow Up       There were no vitals filed for this visit.    There is no height or weight on file to calculate BMI.      MAURICIO Sanchez NREMT

## 2023-01-08 ENCOUNTER — HEALTH MAINTENANCE LETTER (OUTPATIENT)
Age: 58
End: 2023-01-08

## 2023-06-02 ENCOUNTER — HEALTH MAINTENANCE LETTER (OUTPATIENT)
Age: 58
End: 2023-06-02

## 2023-06-21 NOTE — OP NOTE
Ms. Perry is a 57 year old year old female here for a percutaneous tenotomy of the common extensor tendon using ultrasound guidance to cut and remove the pathologic tissue.  Kamilah has a history of chronic left lateral epicondylitis at the common extensor tendon of the left elbow.    The anatomy was identified and the diseased tissue was visualized and confirmed at the common extensor tendon near the insertion.  The area was prepped with chlorhexidine.  Local anesthesia was provided with a local injection of 5 mL of 1% lidocaine, using a 25 gauge needle.  A #11 blade was used to puncture the skin to the site of the pathologic tissue.  A sterile sleeve was placed over the ultrasound transducer.    To section the tendon the surgical instrument is introduced through the puncture site advancing to the diseased tendon.  Once the tip of the instrument is confirmed to be on the pathologic tissue, the foot pedal was depressed and the tendon is incised along its length, cutting and removing the diseased tendon tissue.  Procedure was performed with a total of 2 minutes and 13 seconds of cutting time.    Following the procedure, steri-strips were placed, followed by Tegaderm and a Tubigrip compression sleeve.  Post-operative instructions were given as follows:    Elbow Percutaneous Tenotomy Procedure - After Care Instructions    Rest arm and hand today    May resume non-repetitive use of arm and hand in 2 days. Gentle range of motion is encouraged.    No lifting objects with arm/hand greater than 5 pounds for 6 weeks.    May resume normal use of arm in 6 weeks.    No repetitive lifting greater than 20lbs for 3 months.    Replace bandage at least once per day for first 7 days    Keep compression sleeve on arm for 4 days.    Keep bandages and procedure area clean and dry.    Do not bathe (submerge arm in water) arm for 1 week, showering is ok.    If you experience discomfort in the first few days after the procedure, you may  use application of an ice pack over the elbow. Keep in place for 20 minutes and then remove for at least 20 minute before reapplying if necessary. You may also use acetaminophen (Tylenol) as directed on container.     If you notice increasing redness, warmth and pain, fever, or drainage from the wound then notify our office at [phone]    Return to this clinic for your follow-up visit in 2 weeks.      Kamilah is scheduled to follow up in 2 weeks.        DO ARTURO ScruggsM     Body Location Override (Optional): Left central temple (path states left forehead)

## 2024-04-20 ENCOUNTER — HEALTH MAINTENANCE LETTER (OUTPATIENT)
Age: 59
End: 2024-04-20

## 2024-06-29 ENCOUNTER — HEALTH MAINTENANCE LETTER (OUTPATIENT)
Age: 59
End: 2024-06-29

## 2025-01-28 NOTE — TELEPHONE ENCOUNTER
Patient called evening of 11/2. She is s/p monoka stent with Dr. Marmolejo 10/31 for acute canaliculitis. The stent fell out Friday night. She is not having pain or any other symptoms. Discussed with plastics fellow. Called patient back this morning. Plan for clinic follow-up early next week. Otherwise continue all current management.     Gillian Han MD  Ophthalmology Resident, PGY-2   What Type Of Note Output Would You Prefer (Optional)?: Standard Output How Severe Is Your Skin Lesion?: moderate Has Your Skin Lesion Been Treated?: not been treated Is This A New Presentation, Or A Follow-Up?: Skin Lesion

## 2025-03-17 ENCOUNTER — TELEPHONE (OUTPATIENT)
Dept: ORTHOPEDICS | Facility: CLINIC | Age: 60
End: 2025-03-17
Payer: COMMERCIAL

## 2025-03-17 DIAGNOSIS — M25.522 LEFT ELBOW PAIN: Primary | ICD-10-CM

## 2025-03-17 NOTE — TELEPHONE ENCOUNTER
Patient calling today as in 08/22 she had a procedure done. She is now having trouble with it and has questions.

## 2025-03-17 NOTE — TELEPHONE ENCOUNTER
Left VM for patient. In message I read Dr. Mar response to her 4 questions and left our number for a call back in she had questions.    MAURICIO Sanchez NREMT

## 2025-04-14 ENCOUNTER — OFFICE VISIT (OUTPATIENT)
Dept: ORTHOPEDICS | Facility: CLINIC | Age: 60
End: 2025-04-14
Payer: COMMERCIAL

## 2025-04-14 DIAGNOSIS — M25.522 LEFT ELBOW PAIN: ICD-10-CM

## 2025-04-14 DIAGNOSIS — M77.12 LATERAL EPICONDYLITIS, LEFT ELBOW: Primary | ICD-10-CM

## 2025-04-14 PROCEDURE — 1125F AMNT PAIN NOTED PAIN PRSNT: CPT | Performed by: FAMILY MEDICINE

## 2025-04-14 PROCEDURE — 99214 OFFICE O/P EST MOD 30 MIN: CPT | Performed by: FAMILY MEDICINE

## 2025-04-14 RX ORDER — MELOXICAM 15 MG/1
15 TABLET ORAL DAILY PRN
Qty: 30 TABLET | Refills: 1 | Status: SHIPPED | OUTPATIENT
Start: 2025-04-14

## 2025-04-14 ASSESSMENT — PAIN SCALES - GENERAL: PAINLEVEL_OUTOF10: MODERATE PAIN (6)

## 2025-04-14 NOTE — NURSING NOTE
Chief Complaint   Patient presents with    Left Elbow - Pain, Follow Up     Left elbow follow up       There were no vitals filed for this visit.    There is no height or weight on file to calculate BMI.      MAURICIO Sanchez NREMT

## 2025-04-14 NOTE — PROGRESS NOTES
CHIEF COMPLAINT:  Pain and Follow Up of the Left Elbow (Left elbow follow up)       HISTORY OF PRESENT ILLNESS  Ms. Perry is a 60 year old female who presents to clinic today with left elbow pain.  Kamilah does have a history of lateral epicondylitis for which we performed a percutaneous tenotomy with Tenex about 2-1/2 years ago.  This was doing great until a couple of months ago her symptoms started to come back with no single inciting event.  She works as a , near [employer] she does note that she was busier at work which may have contributed to her pain.  She points to the outside of the elbow, her pain is very similar to her previous symptoms of lateral epicondylitis.        Additional history: as documented    MEDICAL HISTORY  Patient Active Problem List   Diagnosis    Xerosis cutis    KP (keratosis pilaris)    CD (contact dermatitis)    Focal hyperhidrosis    Erosio interdigitalis blastomycetica    Hypothyroid    CARDIOVASCULAR SCREENING; LDL GOAL LESS THAN 130    Acute maxillary sinusitis    Generalized anxiety disorder    Cervical pain    Disorder of ligament of ankle, right    Pain in wrist, unspecified laterality    Left elbow pain    Lateral epicondylitis of left elbow       Current Outpatient Medications   Medication Sig Dispense Refill    Cholecalciferol (VITAMIN D) 1000 UNITS capsule Take 1 capsule by mouth daily.      citalopram (CELEXA) 10 MG tablet Take 10 mg by mouth daily  3    DAILY MULTIVITAMIN OR 1 Tablet daily      estradiol (VIVELLE-DOT) 0.075 MG/24HR 0.30mg      hydrochlorothiazide (MICROZIDE) 12.5 MG capsule Take 25 mg by mouth every morning Pt reports      magnesium 200 MG TABS Take by mouth daily      meloxicam (MOBIC) 15 MG tablet Take 1 tablet (15 mg) by mouth daily as needed. 30 tablet 1    Omega-3 Fatty Acids (FISH OIL) 1000 MG CPDR Take  by mouth daily.      levothyroxine (SYNTHROID, LEVOTHROID) 125 MCG tablet Take 125 mcg by mouth daily.      levothyroxine  (SYNTHROID/LEVOTHROID) 112 MCG tablet Take 1 tablet by mouth daily  3    PATADAY 0.2 % SOLN          Allergies   Allergen Reactions    Sulfa Antibiotics Hives       Family History   Problem Relation Age of Onset    Cancer Maternal Grandfather         skin    Hypertension Paternal Grandmother     Hypertension Paternal Grandfather     Congenital Anomalies Other     Diabetes No family hx of     C.A.D. No family hx of     Family History Negative No family hx of     Asthma No family hx of     Cerebrovascular Disease No family hx of     Breast Cancer No family hx of     Cancer - colorectal No family hx of     Alcohol/Drug No family hx of     Alzheimer Disease No family hx of     Arthritis No family hx of     Eye Disorder No family hx of     Heart Disease No family hx of     Psychotic Disorder No family hx of     Hearing Loss No family hx of        Additional medical/Social/Surgical histories reviewed in [facility] and updated as appropriate.        PHYSICAL EXAM  General  - normal appearance, in no obvious distress    Musculoskeletal - left elbow  - palpation: mildly tender at the origin of the common extensor tendon  - ROM:  160 deg flexion   0 deg extension   90 deg pronation   90 deg supination  - strength: 5/5 in all planes  - special tests:  (-) varus  (-) valgus  Neuro  - no sensory or motor deficit, grossly normal coordination, normal muscle tone             ASSESSMENT & PLAN  Ms. Perry is a 60 year old female who presents to clinic today with left elbow pain.    Her symptoms are indeed consistent with lateral epicondylitis, this does seem to be more mild as compared to prior to her procedure in 2022.  She has been using a brace diligently for the past few weeks, which I do think has contributed to keeping this at bay so far.    I do recommend that she continue to use her brace, I am also prescribing her meloxicam and recommending topical diclofenac.  In addition to this she has exercises at home for when she was  referred to physical therapy initially.    If her symptoms or not improving whatsoever in the coming 4 weeks I would likely recommend MR imaging.  She is going to let me know if this is the case.      It was a pleasure seeing Kamilah today.    Sagar Gordillo DO, [facility]  Primary Care Sports Medicine      This note was constructed using Dragon dictation software, please excuse any minor errors in spelling, grammar, or syntax.

## 2025-04-14 NOTE — LETTER
4/14/2025      Kamilah Perry  [address] [facility] [address]      Dear Colleague,    Thank you for referring your patient, Kamilah Perry, to the [facility] [place]. Please see a copy of my visit note below.    CHIEF COMPLAINT:  Pain and Follow Up of the Left Elbow (Left elbow follow up)       HISTORY OF PRESENT ILLNESS  Ms. Perry is a 60 year old female who presents to clinic today with left elbow pain.  Kamilah does have a history of lateral epicondylitis for which we performed a percutaneous tenotomy with Tenex about 2-1/2 years ago.  This was doing great until a couple of months ago her symptoms started to come back with no single inciting event.  She works as a , near [employer] she does note that she was busier at work which may have contributed to her pain.  She points to the outside of the elbow, her pain is very similar to her previous symptoms of lateral epicondylitis.        Additional history: as documented    MEDICAL HISTORY  Patient Active Problem List   Diagnosis     Xerosis cutis     KP (keratosis pilaris)     CD (contact dermatitis)     Focal hyperhidrosis     Erosio interdigitalis blastomycetica     Hypothyroid     CARDIOVASCULAR SCREENING; LDL GOAL LESS THAN 130     Acute maxillary sinusitis     Generalized anxiety disorder     Cervical pain     Disorder of ligament of ankle, right     Pain in wrist, unspecified laterality     Left elbow pain     Lateral epicondylitis of left elbow       Current Outpatient Medications   Medication Sig Dispense Refill     Cholecalciferol (VITAMIN D) 1000 UNITS capsule Take 1 capsule by mouth daily.       citalopram (CELEXA) 10 MG tablet Take 10 mg by mouth daily  3     DAILY MULTIVITAMIN OR 1 Tablet daily       estradiol (VIVELLE-DOT) 0.075 MG/24HR 0.30mg       hydrochlorothiazide (MICROZIDE) 12.5 MG capsule Take 25 mg by mouth every morning Pt reports       magnesium 200 MG TABS Take by mouth daily        meloxicam (MOBIC) 15 MG tablet Take 1 tablet (15 mg) by mouth daily as needed. 30 tablet 1     Omega-3 Fatty Acids (FISH OIL) 1000 MG CPDR Take  by mouth daily.       levothyroxine (SYNTHROID, LEVOTHROID) 125 MCG tablet Take 125 mcg by mouth daily.       levothyroxine (SYNTHROID/LEVOTHROID) 112 MCG tablet Take 1 tablet by mouth daily  3     PATADAY 0.2 % SOLN          Allergies   Allergen Reactions     Sulfa Antibiotics Hives       Family History   Problem Relation Age of Onset     Cancer Maternal Grandfather         skin     Hypertension Paternal Grandmother      Hypertension Paternal Grandfather      Congenital Anomalies Other      Diabetes No family hx of      C.A.D. No family hx of      Family History Negative No family hx of      Asthma No family hx of      Cerebrovascular Disease No family hx of      Breast Cancer No family hx of      Cancer - colorectal No family hx of      Alcohol/Drug No family hx of      Alzheimer Disease No family hx of      Arthritis No family hx of      Eye Disorder No family hx of      Heart Disease No family hx of      Psychotic Disorder No family hx of      Hearing Loss No family hx of        Additional medical/Social/Surgical histories reviewed in [facility] and updated as appropriate.        PHYSICAL EXAM  General  - normal appearance, in no obvious distress    Musculoskeletal - left elbow  - palpation: mildly tender at the origin of the common extensor tendon  - ROM:  160 deg flexion   0 deg extension   90 deg pronation   90 deg supination  - strength: 5/5 in all planes  - special tests:  (-) varus  (-) valgus  Neuro  - no sensory or motor deficit, grossly normal coordination, normal muscle tone             ASSESSMENT & PLAN  Ms. Perry is a 60 year old female who presents to clinic today with left elbow pain.    Her symptoms are indeed consistent with lateral epicondylitis, this does seem to be more mild as compared to prior to her procedure in 2022.  She has been using a brace  diligently for the past few weeks, which I do think has contributed to keeping this at bay so far.    I do recommend that she continue to use her brace, I am also prescribing her meloxicam and recommending topical diclofenac.  In addition to this she has exercises at home for when she was referred to physical therapy initially.    If her symptoms or not improving whatsoever in the coming 4 weeks I would likely recommend MR imaging.  She is going to let me know if this is the case.      It was a pleasure seeing Kamilah today.    Sagar Gordillo DO, [facility]  Primary Care Sports Medicine      This note was constructed using Dragon dictation software, please excuse any minor errors in spelling, grammar, or syntax.      Again, thank you for allowing me to participate in the care of your patient.        Sincerely,        Sagar Gordillo DO    Electronically signed

## 2025-04-16 ENCOUNTER — TELEPHONE (OUTPATIENT)
Dept: ORTHOPEDICS | Facility: CLINIC | Age: 60
End: 2025-04-16
Payer: COMMERCIAL

## 2025-04-16 NOTE — TELEPHONE ENCOUNTER
Medication Question or concern regarding medication   Prescription Clarification  Name of Medication: meloxicam (MOBIC) 15 MG tablet   Prescribing Provider: Dr. Sagar Gordillo   Pharmacy: [facility]   What on the order needs clarification? Patient is wondering how long she should be on the medication? She thought Dr. Gordillo said 2 weeks but would like to double check.      Could we send this information to you in "[email or web address] or would you prefer to receive a phone call?:   Patient would prefer a phone call   Okay to leave a detailed message?: Yes at Cell number on file:    Telephone Information:   Mobile [phone]

## 2025-04-17 NOTE — TELEPHONE ENCOUNTER
Let patient know that she can take the medication for 2 weeks and as needed after that timeframe - per Dr. Gordillo.    MAURICIO Sanchez NREMT

## 2025-07-13 ENCOUNTER — HEALTH MAINTENANCE LETTER (OUTPATIENT)
Age: 60
End: 2025-07-13

## (undated) DEVICE — MARKER SKIN DOUBLE TIP W/FLEXI-RULER W/LABELS

## (undated) DEVICE — DRSG GAUZE 4X4" TRAY 6939

## (undated) DEVICE — PREP CHLORAPREP 26ML TINTED ORANGE  260815

## (undated) DEVICE — COVER SHOE HI-TOP FLUID RESISTANT

## (undated) DEVICE — SOL NACL 0.9% IRRIG 1000ML BOTTLE 07138-09

## (undated) DEVICE — PAD CHUX UNDERPAD 23X24" 7136

## (undated) DEVICE — NDL 25GA 1.5" 305127

## (undated) DEVICE — DRSG STERI STRIP 1/2X4" R1547

## (undated) DEVICE — CAST STOCKINETTE 4"

## (undated) DEVICE — GLOVE PROTEXIS W/NEU-THERA 7.5  2D73TE75

## (undated) RX ORDER — LIDOCAINE HYDROCHLORIDE 10 MG/ML
INJECTION, SOLUTION EPIDURAL; INFILTRATION; INTRACAUDAL; PERINEURAL
Status: DISPENSED
Start: 2022-08-11